# Patient Record
Sex: FEMALE | Race: WHITE | Employment: FULL TIME | URBAN - METROPOLITAN AREA
[De-identification: names, ages, dates, MRNs, and addresses within clinical notes are randomized per-mention and may not be internally consistent; named-entity substitution may affect disease eponyms.]

---

## 2017-08-31 RX ORDER — METFORMIN HYDROCHLORIDE 500 MG/1
TABLET, EXTENDED RELEASE ORAL
Qty: 30 TABLET | Refills: 1 | Status: SHIPPED | OUTPATIENT
Start: 2017-08-31 | End: 2017-10-19 | Stop reason: SDUPTHER

## 2017-08-31 RX ORDER — FOLIC ACID 1 MG/1
TABLET ORAL
Qty: 30 TABLET | Refills: 1 | Status: SHIPPED | OUTPATIENT
Start: 2017-08-31 | End: 2017-10-19 | Stop reason: SDUPTHER

## 2017-08-31 RX ORDER — PNV,CALCIUM 72/IRON/FOLIC ACID 27 MG-1 MG
TABLET ORAL
Qty: 30 TABLET | Refills: 1 | Status: SHIPPED | OUTPATIENT
Start: 2017-08-31 | End: 2017-10-19 | Stop reason: SDUPTHER

## 2017-09-10 ENCOUNTER — HOSPITAL ENCOUNTER (EMERGENCY)
Age: 37
Discharge: HOME | End: 2017-09-10
Attending: EMERGENCY MEDICINE
Payer: COMMERCIAL

## 2017-09-10 VITALS
DIASTOLIC BLOOD PRESSURE: 95 MMHG | HEIGHT: 62 IN | HEART RATE: 85 BPM | BODY MASS INDEX: 45.82 KG/M2 | WEIGHT: 249 LBS | OXYGEN SATURATION: 97 % | RESPIRATION RATE: 14 BRPM | SYSTOLIC BLOOD PRESSURE: 110 MMHG | TEMPERATURE: 98.1 F

## 2017-09-10 DIAGNOSIS — S86.912A KNEE STRAIN, LEFT, INITIAL ENCOUNTER: Primary | ICD-10-CM

## 2017-09-10 PROCEDURE — 99283 EMERGENCY DEPT VISIT LOW MDM: CPT

## 2017-09-10 RX ORDER — IBUPROFEN 800 MG/1
800 TABLET ORAL EVERY 8 HOURS PRN
Qty: 30 TABLET | Refills: 0 | Status: SHIPPED | OUTPATIENT
Start: 2017-09-10 | End: 2017-10-19

## 2017-09-10 ASSESSMENT — PAIN DESCRIPTION - LOCATION: LOCATION: KNEE

## 2017-09-10 ASSESSMENT — PAIN DESCRIPTION - PAIN TYPE: TYPE: ACUTE PAIN

## 2017-09-10 ASSESSMENT — PAIN SCALES - GENERAL: PAINLEVEL_OUTOF10: 8

## 2017-09-10 ASSESSMENT — PAIN DESCRIPTION - ORIENTATION: ORIENTATION: LEFT

## 2017-10-19 ENCOUNTER — OFFICE VISIT (OUTPATIENT)
Dept: OBGYN CLINIC | Age: 37
End: 2017-10-19
Payer: COMMERCIAL

## 2017-10-19 ENCOUNTER — HOSPITAL ENCOUNTER (OUTPATIENT)
Age: 37
Setting detail: SPECIMEN
Discharge: HOME | End: 2017-10-19
Payer: COMMERCIAL

## 2017-10-19 VITALS
RESPIRATION RATE: 18 BRPM | SYSTOLIC BLOOD PRESSURE: 118 MMHG | WEIGHT: 254 LBS | HEART RATE: 78 BPM | HEIGHT: 62 IN | BODY MASS INDEX: 46.74 KG/M2 | DIASTOLIC BLOOD PRESSURE: 72 MMHG

## 2017-10-19 DIAGNOSIS — Z87.42 HISTORY OF PCOS: ICD-10-CM

## 2017-10-19 DIAGNOSIS — Z87.42 HISTORY OF PCOS: Primary | ICD-10-CM

## 2017-10-19 DIAGNOSIS — N92.6 IRREGULAR PERIODS/MENSTRUAL CYCLES: ICD-10-CM

## 2017-10-19 LAB
ABSOLUTE EOS #: 0.1 K/UL (ref 0–0.4)
ABSOLUTE IMMATURE GRANULOCYTE: NORMAL K/UL (ref 0–0.3)
ABSOLUTE LYMPH #: 3.3 K/UL (ref 1–4.8)
ABSOLUTE MONO #: 0.4 K/UL (ref 0.1–1.3)
ALT SERPL-CCNC: 21 U/L (ref 5–33)
ANION GAP SERPL CALCULATED.3IONS-SCNC: 13 MMOL/L (ref 9–17)
AST SERPL-CCNC: 18 U/L
BASOPHILS # BLD: 0 %
BASOPHILS ABSOLUTE: 0 K/UL (ref 0–0.2)
BUN BLDV-MCNC: 7 MG/DL (ref 6–20)
BUN/CREAT BLD: ABNORMAL (ref 9–20)
CALCIUM SERPL-MCNC: 9.2 MG/DL (ref 8.6–10.4)
CHLORIDE BLD-SCNC: 102 MMOL/L (ref 98–107)
CO2: 24 MMOL/L (ref 20–31)
CREAT SERPL-MCNC: 0.54 MG/DL (ref 0.5–0.9)
DIFFERENTIAL TYPE: NORMAL
EOSINOPHILS RELATIVE PERCENT: 2 %
GFR AFRICAN AMERICAN: >60 ML/MIN
GFR NON-AFRICAN AMERICAN: >60 ML/MIN
GFR SERPL CREATININE-BSD FRML MDRD: ABNORMAL ML/MIN/{1.73_M2}
GFR SERPL CREATININE-BSD FRML MDRD: ABNORMAL ML/MIN/{1.73_M2}
GLUCOSE BLD-MCNC: 150 MG/DL (ref 70–99)
HCT VFR BLD CALC: 41.2 % (ref 36–46)
HEMOGLOBIN: 14.8 G/DL (ref 12–16)
IMMATURE GRANULOCYTES: NORMAL %
LYMPHOCYTES # BLD: 42 %
MCH RBC QN AUTO: 32.9 PG (ref 26–34)
MCHC RBC AUTO-ENTMCNC: 35.9 G/DL (ref 31–37)
MCV RBC AUTO: 91.6 FL (ref 80–100)
MONOCYTES # BLD: 5 %
PDW BLD-RTO: 12.5 % (ref 11.5–14.9)
PLATELET # BLD: 256 K/UL (ref 150–450)
PLATELET ESTIMATE: NORMAL
PMV BLD AUTO: 8.8 FL (ref 6–12)
POTASSIUM SERPL-SCNC: 3.4 MMOL/L (ref 3.7–5.3)
RBC # BLD: 4.5 M/UL (ref 4–5.2)
RBC # BLD: NORMAL 10*6/UL
SEG NEUTROPHILS: 51 %
SEGMENTED NEUTROPHILS ABSOLUTE COUNT: 4.1 K/UL (ref 1.3–9.1)
SODIUM BLD-SCNC: 139 MMOL/L (ref 135–144)
TSH SERPL DL<=0.05 MIU/L-ACNC: 1.19 MIU/L (ref 0.3–5)
WBC # BLD: 7.9 K/UL (ref 3.5–11)
WBC # BLD: NORMAL 10*3/UL

## 2017-10-19 PROCEDURE — 99214 OFFICE O/P EST MOD 30 MIN: CPT | Performed by: ADVANCED PRACTICE MIDWIFE

## 2017-10-19 PROCEDURE — 84460 ALANINE AMINO (ALT) (SGPT): CPT

## 2017-10-19 PROCEDURE — 85025 COMPLETE CBC W/AUTO DIFF WBC: CPT

## 2017-10-19 PROCEDURE — 84450 TRANSFERASE (AST) (SGOT): CPT

## 2017-10-19 PROCEDURE — 84443 ASSAY THYROID STIM HORMONE: CPT

## 2017-10-19 PROCEDURE — 80048 BASIC METABOLIC PNL TOTAL CA: CPT

## 2017-10-19 PROCEDURE — 36415 COLL VENOUS BLD VENIPUNCTURE: CPT

## 2017-10-19 RX ORDER — PNV,CALCIUM 72/IRON/FOLIC ACID 27 MG-1 MG
1 TABLET ORAL DAILY
Qty: 90 TABLET | Refills: 4 | Status: SHIPPED | OUTPATIENT
Start: 2017-10-19 | End: 2019-10-17 | Stop reason: SDUPTHER

## 2017-10-19 RX ORDER — METFORMIN HYDROCHLORIDE 500 MG/1
500 TABLET, EXTENDED RELEASE ORAL NIGHTLY
Qty: 90 TABLET | Refills: 4 | Status: SHIPPED | OUTPATIENT
Start: 2017-10-19 | End: 2019-10-17 | Stop reason: SDUPTHER

## 2017-10-19 RX ORDER — FOLIC ACID 1 MG/1
1 TABLET ORAL DAILY
Qty: 90 TABLET | Refills: 4 | Status: SHIPPED | OUTPATIENT
Start: 2017-10-19 | End: 2019-10-17 | Stop reason: SDUPTHER

## 2017-10-19 ASSESSMENT — PATIENT HEALTH QUESTIONNAIRE - PHQ9
SUM OF ALL RESPONSES TO PHQ QUESTIONS 1-9: 0
1. LITTLE INTEREST OR PLEASURE IN DOING THINGS: 0
2. FEELING DOWN, DEPRESSED OR HOPELESS: 0
SUM OF ALL RESPONSES TO PHQ9 QUESTIONS 1 & 2: 0

## 2017-10-19 NOTE — PATIENT INSTRUCTIONS
Learning About Natural Family Planning  What is natural family planning? Natural family planning is a way to find out which days of the month you are most likely to get pregnant. To prevent pregnancy, you do not have sex on those days. If you want to get pregnant, you have sex during those days. But a woman may use natural family planning and still not get the results she wants. This method may not work well for you if your periods are not regular. It also may not work well if you have problems keeping track of your periods or taking your temperature at the same time each day. How well does it work as birth control? Family planning takes a lot of effort. You must be very aware of your body. And you must track many things closely. It can be very hard to do \"exactly as directed. \" This type of family planning does not work better than other birth control methods. In the first year of use:  · When natural family planning is used exactly as directed, 5 women out of 100 have an unplanned pregnancy. · When it is not used exactly as directed, 24 women out of 100 have an unplanned pregnancy. How do you find out when you are likely to get pregnant? A woman who has regular periods has about 5 to 9 fertile days each month. These are the days when she can get pregnant. To find out when you are fertile, you must know when you release an egg (ovulate). There are several ways to find out when you are fertile. To get the result you want, you may need to use some of these methods at the same time. You should check your body changes using these methods for several months before you use them to avoid pregnancy. · In the calendar, or rhythm method, you write down when you start your period. This tells you how long your cycle is. It also tells you how regular it is. With this information, you can guess which days of the month you are most likely to be fertile. This is between 9 and 17 days before your next period.  This taking birth control pills. It also may not work well just before menopause. · You must pay attention to body changes and record all details. · You can't use the method until you've tracked body changes for a few months. Where can you learn more? Go to https://cheitan.[facility]. org and sign in to your Social[employer] account. Enter A452 in the Safend box to learn more about \"Learning About [facility]. \"     If you do not have an account, please click on the \"Sign Up Now\" link. Current as of: March 16, 2017  Content Version: 11.3  © 5017-9943 [employer]. Care instructions adapted under license by [facility] ([facility]). If you have questions about a medical condition or this instruction, always ask your healthcare professional. [address] any warranty or liability for your use of this information. clomiphene  Pronunciation:  Me[place] banks  Brand:  Clomid, Serophene  What is the most important information I should know about clomiphene? Do not use clomiphene if you are already pregnant. You should not use clomiphene if you have: liver disease, abnormal vaginal bleeding, an uncontrolled adrenal gland or thyroid disorder, an ovarian cyst (unrelated to polycystic ovary syndrome), or if you are pregnant. What is clomiphene? Clomiphene is a nonsteroidal fertility medicine. It causes the pituitary gland to release hormones needed to stimulate ovulation (the release of an egg from the ovary). Clomiphene is used to cause ovulation in women with certain medical conditions (such as polycystic ovary syndrome) that prevent naturally occurring ovulation. Clomiphene may also be used for purposes not listed in this medication guide. What should I discuss with my healthcare provider before taking clomiphene?   You should not use clomiphene if you are allergic to it, or if you have:  · abnormal vaginal bleeding;  · an ovarian cyst that is not related to polycystic ovary syndrome;  · past or present liver disease;  · a tumor of your pituitary gland;  · an untreated or uncontrolled problem with your thyroid or adrenal gland; or  · if you are pregnant. To make sure clomiphene is safe for you, tell your doctor if you have:  · endometriosis or uterine fibroids. Do not use clomiphene if you are already pregnant. Talk to your doctor if you have concerns about the possible effects of clomiphene on a new pregnancy. Clomiphene can pass into breast milk and may harm a nursing baby. This medicine may slow breast milk production in some women. Tell your doctor if you are breast-feeding a baby. Using clomiphene for longer than 3 treatment cycles may increase your risk of developing an ovarian tumor. Ask your doctor about your specific risk. Fertility treatment may increase your chance of having multiple births (twins, triplets). These are high-risk pregnancies both for the mother and the babies. Talk to your doctor if you have concerns about this risk. How should I take clomiphene? Your doctor will perform medical tests to make sure you do not have conditions that would prevent you from safely using clomiphene. Follow all directions on your prescription label. Your doctor may occasionally change your dose to make sure you get the best results. Do not take this medicine in larger or smaller amounts or for longer than recommended. Clomiphene is usually taken for 5 days, starting on the 5th day of your menstrual period. Follow your doctor's instructions. You will need to have a pelvic examination before each treatment cycle. You must remain under the care of a doctor while you are using clomiphene. You will most likely ovulate within 5 to 10 days after you take clomiphene. To improve your chance of becoming pregnant, you should have sexual intercourse while you are ovulating.   Your doctor may have you take your temperature each morning and record your daily readings on a chart. This will help you determine when you can expect ovulation to occur. In most cases, clomiphene should not be used for more than 3 treatment cycles. If ovulation occurs but you do not get pregnant after 3 treatment cycles, your doctor may stop treatment and evaluate your infertility further. Store at room temperature away from moisture, heat, and light. What happens if I miss a dose? Call your doctor for instructions if you miss a dose of clomiphene. What happens if I overdose? Seek emergency medical attention or call the Poison Help line at 1-163.871.3777. What should I avoid while taking clomiphene? This medication may cause blurred vision. Be careful if you drive or do anything that requires you to be alert and able to see clearly. What are the possible side effects of clomiphene? Get emergency medical help if you have signs of an allergic reaction: hives; difficult breathing; swelling of your face, lips, tongue, or throat. Some women using this medicine develop a condition called ovarian hyperstimulation syndrome (OHSS), especially after the first treatment. OHSS can be a life threatening condition. Call your doctor right away if you have any of the following symptoms of OHSS:  · stomach pain, bloating;  · nausea, vomiting, diarrhea;  · rapid weight gain, especially in your face and midsection;  · little or no urinating; or  · pain when you breathe, rapid heart rate, feeling short of breath (especially when lying down). Stop using clomiphene and call your doctor at once if you have:  · pelvic pain or pressure, enlargement in your pelvic area;  · vision problems;  · seeing flashes of light or \"floaters\" in your vision;  · increased sensitivity of your eyes to light; or  · heavy vaginal bleeding. Common side effects may include:  · flushing (warmth, redness, or tingly feeling);  · breast pain or tenderness;  · headache; or  · breakthrough bleeding or spotting.   This is not a complete list of side effects and others may occur. Call your doctor for medical advice about side effects. You may report side effects to FDA at 7-164-MMS-3199. What other drugs will affect clomiphene? Other drugs may interact with clomiphene, including prescription and over-the-counter medicines, vitamins, and herbal products. Tell each of your health care providers about all medicines you use now and any medicine you start or stop using. Where can I get more information? Your doctor or pharmacist can provide more information about clomiphene. Remember, keep this and all other medicines out of the reach of children, never share your medicines with others, and use this medication only for the indication prescribed. Every effort has been made to ensure that the information provided by [address] is accurate, up-to-date, and complete, but no guarantee is made to that effect. Drug information contained herein may be time sensitive. [facility] information has been compiled for use by healthcare practitioners and consumers in the [place] and therefore [employer] does not warrant that uses outside of the [place] are appropriate, unless specifically indicated otherwise. [facility]'s drug information does not endorse drugs, diagnose patients or recommend therapy. [facility] drug information is an informational resource designed to assist licensed healthcare practitioners in caring for their patients and/or to serve consumers viewing this service as a supplement to, and not a substitute for, the expertise, skill, knowledge and judgment of healthcare practitioners. The absence of a warning for a given drug or drug combination in no way should be construed to indicate that the drug or drug combination is safe, effective or appropriate for any given patient. [facility] does not assume any responsibility for any aspect of healthcare administered with the aid of information [facility] provides.  The information contained herein

## 2017-10-19 NOTE — PROGRESS NOTES
History and Physical  [address], [address] [facility] [address] [phone]   Fax [phone]  Karen Guerrero  10/19/2017              42 y.o. Chief Complaint   Patient presents with    Annual Exam       Patient's last menstrual period was 2017 (exact date). Primary Care Physician: Tito Galvez MD    The patient was seen and examined. She has no chief complaint today and is here for her annual exam.  Her bowels are regular. There are no voiding complaints. She denies any bloating. She denies vaginal discharge and was counseled on STD's and the need for barrier contraception. HPI : Karen Guerrero is a 42 y.o. female     Gyn exam, states trying to conceive for last 8 years with same partner, H/O endometriosis and surgery 8 years ago and ? PCO. She is on metformin, PVN, and folic acid per Dr Chance Master. Pt is requesting to try a few rounds of clomid. Her partner has 2 children.  Pt states Glucophage helps regulate her periods  ________________________________________________________________________  Obstetric History       T0      L0     SAB0   TAB0   Ectopic0   Molar0   Multiple0   Live Births0         Past Medical History:   Diagnosis Date    Acid reflux     Bruises easily     Insulin resistance     Obesity, Class III, BMI 40-49.9 (morbid obesity) ([facility] [address] 2015    Vision abnormalities                                                                    Past Surgical History:   Procedure Laterality Date    LAPAROSCOPY  2/1/10    Open    OTHER SURGICAL HISTORY  2/1/10    chromopertubation with bilateral patent fallopian tubes     Family History   Problem Relation Age of Onset    Heart Disease Father     Other Mother      pulmonary HTN, pulmonary fibrosis    Breast Cancer Mother      initially dx before age 48 & again after age 48   Ce [place] Other Sister      hysterectomy    Diabetes Sister and Situation  There is no Mood / Affect changes    Breast:  (Chest)  normal appearance, no masses or tenderness, Inspection negative  Self breast exams were reviewed in detail. Literature was given. Pelvic Exam:  Vulva and vagina appear normal. Bimanual exam reveals normal uterus and adnexa. Rectal Exam:  exam declined by patient          Musculosk:  Normal Gait and station was noted. Digits were evaluated without abnormal findings. Range of motion, stability and strength were evaluated and found to be appropriate for the patients age. OMM Structural Component:  The patient did not complain of a Chief complaint requiring OMM. Chief Complaint:none    Structural Exam: No Interest                  ASSESSMENT:      42 y.o. Annual  1. History of PCOS  ALT    AST    Basic Metabolic Panel    BUN & Creatinine    CBC Auto Differential    TSH with Reflex   2.  Irregular periods/menstrual cycles  ALT    AST    Basic Metabolic Panel    BUN & Creatinine    CBC Auto Differential    TSH with Reflex          Chief Complaint   Patient presents with    Annual Exam          Past Medical History:   Diagnosis Date    Acid reflux     Bruises easily     Insulin resistance     Obesity, Class III, BMI 40-49.9 (morbid obesity) ([facility] [address] 7/28/2015    Vision abnormalities          Patient Active Problem List    Diagnosis Date Noted    Insulin resistance      Priority: High     Repeat LFT and Renal Function in 6/2013      Metrorrhagia 10/31/2012     Priority: High    BMI 40.0-44.9, adult ([address] 10/31/2012     Priority: Medium    Family history of diabetes mellitus (DM) 10/31/2012     Priority: Medium    Acid reflux      Priority: Medium    Encounter for routine gynecological examination 10/31/2012     Priority: Low    Vision abnormalities      Priority: Low    Bruises easily      Priority: Low    Family history of breast cancer in mother 08/02/2016 8/25/2016 patient will need follow up mammogram in one year      Standing Expiration Date:   10/19/2018    AST     Standing Status:   Future     Standing Expiration Date:   10/19/2018    Basic Metabolic Panel     Standing Status:   Future     Standing Expiration Date:   10/19/2018    BUN & Creatinine     Standing Status:   Future     Standing Expiration Date:   10/19/2018    CBC Auto Differential     Standing Status:   Future     Standing Expiration Date:   10/19/2018    TSH with Reflex     Standing Status:   Future     Standing Expiration Date:   10/19/2018

## 2017-10-23 ENCOUNTER — TELEPHONE (OUTPATIENT)
Dept: OBGYN CLINIC | Age: 37
End: 2017-10-23

## 2017-10-23 NOTE — TELEPHONE ENCOUNTER
----- Message from Jimmy Wick CNM sent at 10/23/2017  8:09 AM EDT -----  Refer to PCP elevated glucose and low potassium  CBC, ALT, AST and TSH within normal limits

## 2017-11-30 ENCOUNTER — OFFICE VISIT (OUTPATIENT)
Dept: OBGYN CLINIC | Age: 37
End: 2017-11-30
Payer: COMMERCIAL

## 2017-11-30 VITALS
HEART RATE: 80 BPM | BODY MASS INDEX: 46.93 KG/M2 | DIASTOLIC BLOOD PRESSURE: 78 MMHG | HEIGHT: 62 IN | WEIGHT: 255 LBS | SYSTOLIC BLOOD PRESSURE: 124 MMHG | RESPIRATION RATE: 20 BRPM

## 2017-11-30 DIAGNOSIS — Z87.42 HISTORY OF PCOS: Primary | ICD-10-CM

## 2017-11-30 DIAGNOSIS — E88.81 INSULIN RESISTANCE: ICD-10-CM

## 2017-11-30 DIAGNOSIS — O09.529 ANTEPARTUM MULTIGRAVIDA OF ADVANCED MATERNAL AGE: ICD-10-CM

## 2017-11-30 PROCEDURE — 99214 OFFICE O/P EST MOD 30 MIN: CPT | Performed by: OBSTETRICS & GYNECOLOGY

## 2018-09-13 ENCOUNTER — HOSPITAL ENCOUNTER (OUTPATIENT)
Dept: PHYSICAL THERAPY | Age: 38
Setting detail: THERAPIES SERIES
Discharge: HOME | End: 2018-09-13
Payer: OTHER GOVERNMENT

## 2018-09-13 PROCEDURE — 97110 THERAPEUTIC EXERCISES: CPT

## 2018-09-13 PROCEDURE — 97162 PT EVAL MOD COMPLEX 30 MIN: CPT

## 2018-09-13 NOTE — CONSULTS
Standing [] [] []   Ambulation [] [] []   Groom/Dress [] [] []   Lift/Carry [] [x] []   Stairs [] [] []   Bending [] [x] []   OH reach [] [] []   Sit to Stand [] [] []     Comments:    Assessment:  Problems:    [x] ? Back Pain:  Back pain 3-7/10    [] ? Cervical Pain:    [x] ? ROM: Limited lumbar ROM with flexion, extension, left rotation    [x] ? Strength: Gross weakness bilateral LE and core  [x] ? Function:  Oswestry 11%  [x] Postural Deviations  [] Gait Deviations  [] Other:    STG: (to be met in 6 treatments)  1. ? Pain:  Back pain 0-2 with no left LE radic after work shift  2. ? ROM:  Lumbar ROM 90%   3. ? Strength: 5-  4. ? Function: 5% or less  5. Independent with Home Exercise Programs  6. Demonstrate Knowledge of fall prevention      Patient goals:  Feel better      Rehab Potential:  [x] Good  [] Fair  [] Poor   Suggested Professional Referral:  [x] No  [] Yes:  Barriers to Goal Achievement[de-identified]  [x] No  [] Yes:  Domestic Concerns:  [x] No  [] Yes:    Pt. Education:  [x] Plans/Goals, Risks/Benefits discussed  [x] Home exercise program  Method of Education: [x] Verbal  [x] Demo  [x] Written  Comprehension of Education:  [x] Verbalizes understanding. [x] Demonstrates understanding. [x] Needs Review. [] Demonstrates/verbalizes understanding of HEP/Ed previously given. Treatment Plan:  [x] Therapeutic Exercise    [x] Modalities:  [] Therapeutic Activity    [] Ultrasound  [x] Electrical Stimulation  [] Gait Training     []Massage       [x] Lumbar/Cervical Traction  [] Neuromuscular Re-education [x] Cold/hotpack [x] Iontophoresis: 4 mg/mL  [x] Instruction in HEP             Dexamethasone Sodium  [x] Manual Therapy             Phosphate 40-80 mAmin  [x] Aquatic Therapy   [x] Other: Dry Needling    [x]  Medication allergies reviewed for use of    Dexamethasone Sodium Phosphate 4mg/ml     with iontophoresis treatments. Pt is not allergic.     Frequency: WC authorization 6 visits total, valid 8/28 - 9/28.      Suly Bowens Treatment:  Modalities:   Precautions:  Bruises easily  Exercises:  Exercise Reps/ Time Weight/ Level Comments         TrA neutral draw in 5 sec hold x 10     HS and piriformis stretch 20-30 sec x 3 each                 Other:    Specific Instructions for next treatment:  Core stabs, LE strength, flexion bias    Treatment Charges: Mins Units   [x] Evaluation       []  Low       [x]  Moderate       []  High 30 1   []  Modalities     [x]  Ther Exercise 15 1   []  Manual Therapy     []  Ther Activities     []  Aquatics     []  Neuromuscular     []  Other       TOTAL TREATMENT TIME: 45    Time in: 1400      Time out: 9239    Electronically signed by: Suze Omalley PT        Physician Signature:________________________________Date:__________________  By signing above or cosigning this note, I have reviewed this plan of care and certify a need for medically necessary rehabilitation services.      *PLEASE SIGN ABOVE AND FAX BACK ALL PAGES*

## 2018-09-17 ENCOUNTER — HOSPITAL ENCOUNTER (OUTPATIENT)
Dept: PHYSICAL THERAPY | Age: 38
Setting detail: THERAPIES SERIES
Discharge: HOME | End: 2018-09-17
Payer: OTHER GOVERNMENT

## 2018-09-17 PROCEDURE — 97113 AQUATIC THERAPY/EXERCISES: CPT

## 2018-09-17 NOTE — PROGRESS NOTES
Physical Therapy    [address] [facility]   [address]   Phone: [phone]   Fax: [phone]  Physical Therapy Daily Treatment Note  Date:  2018  Patient Name:  Elmo Domínguez    :  1980  MRN: [id number]  Physician: Bernice Diop MD                                    Insurance: [facility]     Eligibility Status: Erin Short  # of visits allowed/remainin visits total, valid  - . Source: Fax  Auth: NPRe  Medical Diagnosis: Strain of muscle, fascia and tendon of lower back, initial encounter     Rehab Codes: M54.5, M54.16  Onset Date: 2018                       Next 's appt. : tbd  Visit# / total visits: 2/  Cancels/No Shows: 0/0    Subjective:  States she is working mostly full duty now without issue- occasionally gets pain shooting down L LE but only with lifting or full WB on L LE. Denies pain upon arrival  Pain:  [] Yes  [x] No Location: N/A Pain Rating: (0-10 scale) 0/10  Pain altered Tx:  [x] No  [] Yes  Action:  Comments: Initiated aquatic therapy with emphasis on core stability and postural awareness; educated patient on pelvic neutral and benefits of aquatic therapy    Objective:  Exercises:  [facility] Exercise Log  Aquatic, Hip & DLS Program- Phase 1    Date of Eval:                                Primary PT:  Diagnosis: Things to Focus On (goals):   Surgical Precautions:  Medical Precautions:  [] C-9 dates  [] Occ Med   [] Medicare       Date 18       Visit # 2/12       Walk F/L/R 2 Laps       Marching 10x       Squats 10x5\"       Step-Ups F/L        Heel-toe raises 10x       SLR F/L/R 10x       Knee/Flex/Ext 10x       F/L Lunges        Kickboard Ex. Med       Iso Abd. 10x5\"       Push-pull 10x       Paddling                Deep Water        Hang ?                Stretches        Achllies 2x20\"       Hamstring 2x20\"               Cool Down 2 Laps       Pain

## 2018-09-20 ENCOUNTER — HOSPITAL ENCOUNTER (OUTPATIENT)
Dept: PHYSICAL THERAPY | Age: 38
Setting detail: THERAPIES SERIES
Discharge: HOME | End: 2018-09-20
Payer: OTHER GOVERNMENT

## 2018-09-20 PROCEDURE — 97110 THERAPEUTIC EXERCISES: CPT

## 2018-09-20 PROCEDURE — 97140 MANUAL THERAPY 1/> REGIONS: CPT

## 2018-09-21 ENCOUNTER — HOSPITAL ENCOUNTER (OUTPATIENT)
Dept: PHYSICAL THERAPY | Age: 38
Setting detail: THERAPIES SERIES
Discharge: HOME | End: 2018-09-21
Payer: OTHER GOVERNMENT

## 2018-09-21 PROCEDURE — 97140 MANUAL THERAPY 1/> REGIONS: CPT

## 2018-09-21 PROCEDURE — 97110 THERAPEUTIC EXERCISES: CPT

## 2018-09-21 NOTE — FLOWSHEET NOTE
[address] [facility]   [address] [facility] [address]   Phone: [phone]   Fax: [phone]    Physical Therapy Daily Treatment Note      Date:  2018  Patient Name:  Kathryn Sandoval    :  1980  MRN: [id number]  Physician: Jonelle Bernal MD                                    Insurance: [facility]              Eligibility Status:  Eligible        DOS: 18  # of visits allowed/remainin visits total, valid  - . Source: Fax  Spoke To: Isidor Just  Reference: Na  Auth: NPRe     Electronically signed by Cyn Nagel on 18 at 10:04 AM  Medical Diagnosis: Strain of muscle, fascia and tendon of lower back, initial encounter     Rehab Codes: M54.5, M54.16  Onset Date: 2018                       Next 's appt. : tbd  Visit# / total visits: 4/6 (6 visits total, valid  - )  Cancels/No Shows: 0/0    Subjective:  Patient presents to clinic p working 3 hours. No sharp, shooting pain in back  reported. Pain:  [x] Yes  [] No Location: Back pain  N/A Pain Rating: (0-10 scale) 0/10, sharp  Pain altered Tx:  [] No  [x] Yes  Action: Back pain improving. Comments:    Objective:  Modalities:   Precautions:  Bruises easily  Exercises:  Exercise Reps/ Time Weight/ Level Comments    4 point UE/LE  10       TrA neutral draw in 5 sec hold x 10       TrA alt LE/UE 10     HS and piriformis stretch 20-30 sec x 3 each        Hip ER/ABD  10       Stand side and front plank 10 sec hold x 3     Rotational walkout 3  Blue band          Manual:  Left upslip correction      Negative forward bend and Stork, isch tub aligned. Other:  Review body mechanics and lifting with neutral spine and draw in.     Specific Instructions for next treatment:  Core stabs, LE strength, flexion bias    Treatment Charges: Mins Units   []  Modalities     [x]  Ther Exercise 30 2   [x]  Manual Therapy 10 1   []  Ther Activities     []  Aquatics     []  Neuromuscular     [] Other     Total Treatment time 40 3       Assessment: [x] Progressing toward goals. Pelvic obliquity improved. Progressed core therex to standing. Reviewed stabs in supine, 4 point, standing. HEP for standing core therex issued with ISABEL, richie RD, copy in softchart. Recheck therex next visit and DC.        [] No change. [] Other:    STG/LTG  STG: (to be met in 6 treatments)  1. ? Pain:  Back pain 0-2 with no left LE radic after work shift  2. ? ROM:  Lumbar ROM 90%   3. ? Strength: 5-  4. ? Function: 5% or less  5. Independent with Home Exercise Programs  6. Demonstrate Knowledge of fall prevention        Patient goals:  Feel better    Pt. Education:  [x] Yes  [] No  [x] Reviewed Prior HEP/Ed  Method of Education: [x] Verbal  [x] Demo  [x] Written  Comprehension of Education:  [x] Verbalizes understanding. [x] Demonstrates understanding. [] Needs review. [] Demonstrates/verbalizes HEP/Ed previously given. Plan: [x] Continue per plan of care.    [] Other:      Time H           Time Out: 5242    Electronically signed by:  Preet Matias, PT

## 2018-09-27 ENCOUNTER — HOSPITAL ENCOUNTER (OUTPATIENT)
Dept: PHYSICAL THERAPY | Age: 38
Setting detail: THERAPIES SERIES
Discharge: HOME | End: 2018-09-27
Payer: OTHER GOVERNMENT

## 2018-09-27 PROCEDURE — 97110 THERAPEUTIC EXERCISES: CPT

## 2019-09-19 ENCOUNTER — HOSPITAL ENCOUNTER (OUTPATIENT)
Age: 39
Discharge: HOME | End: 2019-09-19
Payer: COMMERCIAL

## 2019-09-19 DIAGNOSIS — Z13.220 SCREENING FOR HYPERLIPIDEMIA: ICD-10-CM

## 2019-09-19 DIAGNOSIS — Z11.4 SCREENING FOR HIV WITHOUT PRESENCE OF RISK FACTORS: ICD-10-CM

## 2019-09-19 DIAGNOSIS — E55.9 VITAMIN D DEFICIENCY: ICD-10-CM

## 2019-09-19 DIAGNOSIS — R53.83 FATIGUE, UNSPECIFIED TYPE: ICD-10-CM

## 2019-09-19 LAB
CHOLESTEROL, FASTING: 190 MG/DL
CHOLESTEROL/HDL RATIO: 3.2
HDLC SERPL-MCNC: 60 MG/DL
HIV AG/AB: NONREACTIVE
LDL CHOLESTEROL: 102 MG/DL (ref 0–130)
THYROXINE, FREE: 1.11 NG/DL (ref 0.93–1.7)
TRIGLYCERIDE, FASTING: 139 MG/DL
TSH SERPL DL<=0.05 MIU/L-ACNC: 1.14 MIU/L (ref 0.3–5)
VITAMIN D 25-HYDROXY: 13.6 NG/ML (ref 30–100)
VLDLC SERPL CALC-MCNC: NORMAL MG/DL (ref 1–30)

## 2019-09-19 PROCEDURE — 36415 COLL VENOUS BLD VENIPUNCTURE: CPT

## 2019-09-19 PROCEDURE — 84443 ASSAY THYROID STIM HORMONE: CPT

## 2019-09-19 PROCEDURE — 80061 LIPID PANEL: CPT

## 2019-09-19 PROCEDURE — 87389 HIV-1 AG W/HIV-1&-2 AB AG IA: CPT

## 2019-09-19 PROCEDURE — 82306 VITAMIN D 25 HYDROXY: CPT

## 2019-09-19 PROCEDURE — 84439 ASSAY OF FREE THYROXINE: CPT

## 2019-09-27 ENCOUNTER — HOSPITAL ENCOUNTER (OUTPATIENT)
Dept: WOMENS IMAGING | Age: 39
Discharge: HOME | End: 2019-09-29
Payer: COMMERCIAL

## 2019-09-27 DIAGNOSIS — Z12.31 ENCOUNTER FOR SCREENING MAMMOGRAM FOR BREAST CANCER: ICD-10-CM

## 2019-09-27 PROCEDURE — 77063 BREAST TOMOSYNTHESIS BI: CPT

## 2019-10-04 ENCOUNTER — TELEPHONE (OUTPATIENT)
Dept: ONCOLOGY | Age: 39
End: 2019-10-04

## 2019-10-04 DIAGNOSIS — Z91.89 AT HIGH RISK FOR BREAST CANCER: Primary | ICD-10-CM

## 2020-08-18 ENCOUNTER — NURSE TRIAGE (OUTPATIENT)
Dept: OTHER | Facility: CLINIC | Age: 40
End: 2020-08-18

## 2020-08-18 NOTE — TELEPHONE ENCOUNTER
Seen that there were no openings was not sure if you wanted to try and fit her in or not? Please advise.

## 2020-08-18 NOTE — TELEPHONE ENCOUNTER
Reason for Disposition   MODERATE pain (e.g., interferes with normal activities) and present > 3 days    Answer Assessment - Initial Assessment Questions  1. ONSET: \"When did the pain start? \"      Friday   2. LOCATION: \"Where is the pain located? \"      Right hand  3. PAIN: \"How bad is the pain? \" (Scale 1-10; or mild, moderate, severe)    - MILD (1-3): doesn't interfere with normal activities    - MODERATE (4-7): interferes with normal activities (e.g., work or school) or awakens from sleep    - SEVERE (8-10): excruciating pain, unable to use hand at all      Pain with movement/gripping 6/10  4. WORK OR EXERCISE: \"Has there been any recent work or exercise that involved this part of the body? \"      At work, picking up a package. 5. CAUSE: \"What do you think is causing the pain? \"      Picking up a heavy package. 6. AGGRAVATING FACTORS: \"What makes the pain worse? \" (e.g., using computer)      Gripping items. 7. OTHER SYMPTOMS: \"Do you have any other symptoms? \" (e.g., neck pain, swelling, rash, numbness, fever)      No  8. PREGNANCY: \"Is there any chance you are pregnant? \" \"When was your last menstrual period? \"      No    Protocols used: HAND AND WRIST PAIN-ADULT-[place]      Care advice given. Connecting with [place]. Instructed to call back or go to urgent care if symptoms worsen. Call from [place]. Please do not respond to the triage nurse through this encounter. Any subsequent communication should be directly with the patient.

## 2020-12-24 ENCOUNTER — HOSPITAL ENCOUNTER (OUTPATIENT)
Age: 40
Setting detail: SPECIMEN
Discharge: HOME | End: 2020-12-24
Payer: COMMERCIAL

## 2020-12-24 ENCOUNTER — OFFICE VISIT (OUTPATIENT)
Dept: PRIMARY CARE CLINIC | Age: 40
End: 2020-12-24
Payer: COMMERCIAL

## 2020-12-24 VITALS
BODY MASS INDEX: 45.08 KG/M2 | HEART RATE: 90 BPM | WEIGHT: 245 LBS | TEMPERATURE: 98.6 F | HEIGHT: 62 IN | OXYGEN SATURATION: 98 %

## 2020-12-24 PROCEDURE — 99213 OFFICE O/P EST LOW 20 MIN: CPT | Performed by: NURSE PRACTITIONER

## 2020-12-24 PROCEDURE — 87880 STREP A ASSAY W/OPTIC: CPT | Performed by: NURSE PRACTITIONER

## 2020-12-24 ASSESSMENT — ENCOUNTER SYMPTOMS
SORE THROAT: 1
RHINORRHEA: 1
DIARRHEA: 0
SHORTNESS OF BREATH: 0
COUGH: 1

## 2020-12-24 ASSESSMENT — PATIENT HEALTH QUESTIONNAIRE - PHQ9
SUM OF ALL RESPONSES TO PHQ QUESTIONS 1-9: 0
SUM OF ALL RESPONSES TO PHQ9 QUESTIONS 1 & 2: 0
1. LITTLE INTEREST OR PLEASURE IN DOING THINGS: 0
SUM OF ALL RESPONSES TO PHQ QUESTIONS 1-9: 0
2. FEELING DOWN, DEPRESSED OR HOPELESS: 0
SUM OF ALL RESPONSES TO PHQ QUESTIONS 1-9: 0

## 2020-12-24 NOTE — LETTER
[address] [place] [id number]  Phone: [phone]  Fax: [phone]    MEREDITH Nesbitt CNP        December 24, 2020     Patient: Natan Lei   YOB: 1980   Date of Visit: 12/24/2020       To Whom It May Concern: It is my medical opinion that Natan Lei was seen today and is awaiting results and should quarantine/isolate until results available. -You have had no fever for at least 24 hours (that is one full days of no fever without the use medicine that reduces fevers)  -AND other symptoms have improved (for example, when your cough or shortness of breath have improved)  -AND at least 10 days have passed since your symptoms first appeared  If you have any questions or concerns, please don't hesitate to call.     Sincerely,          MEREDITH Nesbitt CNP

## 2020-12-24 NOTE — PATIENT INSTRUCTIONS
Your strep test was negative here today    You were tested for COVID today. We will call you with results when they are available. If you have not heard from us in 7 days, please call our office. Quarantine/Isolation as directed  Await results  Recommend not going out, stay home and await results    Increase fluids- stay hydrated  Good handwashing  Supportive care as discussed, treat symptoms     If having symptoms- you need to be fever free for 24 hours, other symptoms resolved and at least 10 days passed since first symptom appeared before discontinuing  quarantine- CDC guidelines    tylenol as directed as needed over the counter if able to take  go to the ER for chest pain, short of breath, abdominal pain, dizzy, hard time breathing, persistent vomiting, feeling weaker or dehydrated, any worsening, change or concern  Follow up with primary care for re evaluation- recommend virtual visit     [place] covid 19 call center - [phone]- [phone]  Another good resource for information is coronavirus. [place].gov    For one with known covid 19 exposure, we recommend 14 day quarantine. The COVID-19 test that was done today can take 1-6 days for results. Until then you should assume you have this disease and adhere to home isolation as described below. When we get the test results back, one of the following readings will be obtained. 1. A positive test means you have the virus. 2.  An inconclusive test means it wasn't sure if you have the virus or not. An inconclusive test result is treated as a positive result and recommendations  are the same as a positive test result. We may ask you to repeat this test in this circumstance. 3.  A negative test means you probably do not have the virus.       Prevention steps for People with positive or inconclusive test results or suspected  COVID-19 (including persons under investigation) who do not need to be hospitalized  and People with confirmed COVID-19 who were hospitalized and determined to be medically stable to go home      Your healthcare provider and public health staff will evaluate whether you can be cared for at home. If it is determined that you do not need to be hospitalized and can be isolated at home, you will be monitored by staff from your health department. You should follow the prevention steps below until a healthcare provider or local or state health department says you can return to your normal activities. Stay home except to get medical care  People who are mildly ill with COVID-19 are able to isolate at home during their illness. You should restrict activities outside your home, except for getting medical care. Do not go to work, school, or public areas. Avoid using public transportation, ride-sharing, or taxis. Separate yourself from other people and animals in your home  People: As much as possible, you should stay in a specific room and away from other people in your home. Also, you should use a separate bathroom, if available. Animals: You should restrict contact with pets and other animals while you are sick with COVID-19, just like you would around other people. Although there have not been reports of pets or other animals becoming sick with COVID-19, it is still recommended that people sick with COVID-19 limit contact with animals until more information is known about the virus. When possible, have another member of your household care for your animals while you are sick. If you are sick with COVID-19, avoid contact with your pet, including petting, snuggling, being kissed or licked, and sharing food. If you must care for your pet or be around animals while you are sick, wash your hands before and after you interact with pets and wear a facemask.   Call ahead before visiting your doctor If you have a medical appointment, call the healthcare provider and tell them that you have or may have COVID-19. This will help the healthcare providers office take steps to keep other people from getting infected or exposed. Wear a facemask  You should wear a facemask when you are around other people (e.g., sharing a room or vehicle) or pets and before you enter a healthcare providers office. If you are not able to wear a facemask (for example, because it causes trouble breathing), then people who live with you should not stay in the same room with you; they should also wear a facemask if they enter your room. Cover your coughs and sneezes  Cover your mouth and nose with a tissue when you cough or sneeze. Throw used tissues in a lined trash can. Immediately wash your hands with soap and water for at least 20 seconds or, if soap and water are not available, clean your hands with an alcohol-based hand  that contains at least 60% alcohol. Clean your hands often  Wash your hands often with soap and water for at least 20 seconds, especially after blowing your nose, coughing, or sneezing; going to the bathroom; and before eating or preparing food. If soap and water are not readily available, use an alcohol-based hand  with at least 60% alcohol, covering all surfaces of your hands and rubbing them together until they feel dry. Soap and water are the best option if hands are visibly dirty. Avoid touching your eyes, nose, and mouth with unwashed hands. Avoid sharing personal household items  You should not share dishes, drinking glasses, cups, eating utensils, towels, or bedding with other people or pets in your home. After using these items, they should be washed thoroughly with soap and water.   Clean all high-touch surfaces everyday High touch surfaces include counters, tabletops, doorknobs, bathroom fixtures, toilets, phones, keyboards, tablets, and bedside tables. Also, clean any surfaces that may have blood, stool, or body fluids on them. Use a household cleaning spray or wipe, according to the label instructions. Labels contain instructions for safe and effective use of the cleaning product including precautions you should take when applying the product, such as wearing gloves and making sure you have good ventilation during use of the product. Monitor your symptoms  Seek prompt medical attention if your illness is worsening (e.g., difficulty breathing). Before seeking care, call your healthcare provider and tell them that you have, or are being evaluated for, COVID-19. Put on a facemask before you enter the facility. These steps will help the healthcare providers office to keep other people in the office or waiting room from getting infected or exposed. Persons who are placed under active monitoring or facilitated self-monitoring should follow instructions provided by their local health department or occupational health professionals, as appropriate. When working with your local health department check their available hours. If you have a medical emergency and need to call 911, notify the dispatch personnel that you have, or are being evaluated for COVID-19. If possible, put on a facemask before emergency medical services arrive. Discontinuing home isolation  Patients with confirmed COVID-19 should remain under home isolation precautions until the risk of secondary transmission to others is thought to be low. The decision to discontinue home isolation precautions should be made on a case-by-case basis, in consultation with your physician and the health department. Please do NOT make this decision on your own.       If your results of the COVID-19 test is NEGATIVE - The patient may stop isolation, in consultation with your health care provider, typically when: Your healthcare provider has determined that the cause of the illness is NOT COVID-19 and approves your return to work. OR  Ten (10) days have passed since onset of symptoms AND one day (24 hours) have passed with no fever without taking medication (like Tylenol) to reduce fever,  respiratory symptoms have resolved and you have been evaluated by your health care provider. Loss of taste and smell may persist.  Please follow up with your physician for evaluation about this. The following websites are the best places for up to date information on this fluid situation. https://coronavirus. [place].gov/wps/portal/gov/covid-19/home/local-health-districts-and-providers/guidance-for-covid-19-exposure-management    Preventing the Spread of Coronavirus Disease 2019 in Homes and Residential Communities     For the most recent information go to [email or web address]  https://coronavirus. [place].gov/wps/portal/gov/covid-19/home/local-health-districts-and-providers/guidance-for-covid-19-exposure-management no back pain, no gout, no musculoskeletal pain, no neck pain, and no weakness.

## 2020-12-24 NOTE — PROGRESS NOTES
[facility] [address] [address]  Dept: [phone]  Dept Fax: [phone]    Citlalli Gordon a 41 y.o. female who presents to the urgent care today for her medical conditions/complaintsas noted below. Citlalli Gordon is c/o of Concern For COVID-19 (sore throat, congestion, itchiness in ear x 3 days )      HPI:     Cc sore throat, congestion, ears itches for 3 days  Denies fever, vomiting, diarrhea, abd pain or known covid exposure  Denies cp, sob  Feels like sinus    URI   This is a new problem. Episode onset: 3 days. The problem has been waxing and waning. There has been no fever. Associated symptoms include congestion, coughing, rhinorrhea and a sore throat. Pertinent negatives include no chest pain or diarrhea. Past Medical History:   Diagnosis Date    Acid reflux     Bruises easily     Insulin resistance     Obesity, Class III, BMI 40-49.9 (morbid obesity) ([facility]) 7/28/2015    Vision abnormalities        Current Outpatient Medications   Medication Sig Dispense Refill    loratadine (CLARITIN) 10 MG tablet Take 1 tablet by mouth daily (Patient not taking: Reported on 12/24/2020) 30 tablet 2    metFORMIN (GLUCOPHAGE-XR) 500 MG extended release tablet Take 1 tablet by mouth nightly (Patient not taking: Reported on 5/29/2020) 90 tablet 4    folic acid (FOLVITE) 1 MG tablet Take 1 tablet by mouth daily (Patient not taking: Reported on 5/29/2020) 90 tablet 4    Prenatal Vit-Fe Fumarate-FA (PREPLUS) 27-1 MG TABS Take 1 tablet by mouth daily (Patient not taking: Reported on 5/29/2020) 90 tablet 4    gabapentin (NEURONTIN) 100 MG capsule Take 1 capsule by mouth 3 times daily for 30 days.  (Patient not taking: Reported on 5/29/2020) 90 capsule 3    meloxicam (MOBIC) 15 MG tablet Take 1 tablet by mouth daily (Patient not taking: Reported on 5/29/2020) 30 tablet 3 No current facility-administered medications for this visit. No Known Allergies    Subjective:     Review of Systems   HENT: Positive for congestion, rhinorrhea and sore throat. Respiratory: Positive for cough. Negative for shortness of breath. Cardiovascular: Negative for chest pain. Gastrointestinal: Negative for diarrhea. All other systems reviewed and are negative. Objective:      Physical Exam  Vitals signs and nursing note reviewed. Constitutional:       General: She is not in acute distress. Appearance: Normal appearance. She is well-developed. She is not ill-appearing, toxic-appearing or diaphoretic. HENT:      Head: Normocephalic and atraumatic. Right Ear: Tympanic membrane normal.      Left Ear: Tympanic membrane normal.      Nose: Congestion present. Mouth/Throat:      Mouth: Mucous membranes are moist.   Eyes:      General: No scleral icterus. Right eye: No discharge. Left eye: No discharge. Neck:      Musculoskeletal: Normal range of motion and neck supple. No neck rigidity. Cardiovascular:      Rate and Rhythm: Normal rate and regular rhythm. Heart sounds: Normal heart sounds. No murmur. Pulmonary:      Effort: Pulmonary effort is normal. No respiratory distress. Breath sounds: Normal breath sounds. No stridor. No wheezing, rhonchi or rales. Abdominal:      General: Bowel sounds are normal.      Palpations: Abdomen is soft. Musculoskeletal: Normal range of motion. General: No signs of injury. Lymphadenopathy:      Cervical: No cervical adenopathy. Skin:     General: Skin is warm and dry. Coloration: Skin is not jaundiced or pale. Findings: No erythema or rash. Neurological:      General: No focal deficit present. Mental Status: She is alert and oriented to person, place, and time.    Psychiatric:         Mood and Affect: Mood normal.         Behavior: Behavior normal. Pulse 90   Temp 98.6 °F (37 °C)   Ht 5' 2\" (1.575 m)   Wt 245 lb (111.1 kg)   SpO2 98%   BMI 44.81 kg/m²       Assessment:          Diagnosis Orders   1. Viral illness  COVID-19 Ambulatory   2. Sore throat  POCT rapid strep A    Strep A DNA probe, amplification       Plan:    rapid strep was neg here today    You were tested for COVID today. We will call you with results when they are available. If you have not heard from us in 7 days, please call our office. Quarantine/Isolation as directed  Await results  Recommend not going out, stay home and await results    Increase fluids- stay hydrated  Good handwashing  Supportive care as discussed, treat symptoms     If having symptoms- you need to be fever free for 24 hours, other symptoms resolved and at least 10 days passed since first symptom appeared before discontinuing  quarantine- CDC guidelines    tylenol as directed as needed over the counter if able to take  go to the ER for chest pain, short of breath, abdominal pain, dizzy, hard time breathing, persistent vomiting, feeling weaker or dehydrated, any worsening, change or concern  Follow up with primary care for re evaluation- recommend virtual visit     [place] covid 19 call center - [phone]- [phone]  Another good resource for information is coronavirus. [place].gov    For one with known covid 19 exposure, we recommend 14 day quarantine. The COVID-19 test that was done today can take 1-6 days for results. Until then you should assume you have this disease and adhere to home isolation as described below. When we get the test results back, one of the following readings will be obtained. 1. A positive test means you have the virus. 2.  An inconclusive test means it wasn't sure if you have the virus or not. An inconclusive test result is treated as a positive result and recommendations  are the same as a positive test result. We may ask you to repeat this test in this circumstance. 3.  A negative test means you probably do not have the virus. Prevention steps for People with positive or inconclusive test results or suspected  COVID-19 (including persons under investigation) who do not need to be hospitalized  and   People with confirmed COVID-19 who were hospitalized and determined to be medically stable to go home      Your healthcare provider and public health staff will evaluate whether you can be cared for at home. If it is determined that you do not need to be hospitalized and can be isolated at home, you will be monitored by staff from your health department. You should follow the prevention steps below until a healthcare provider or local or Counts include 234 beds at the [facility] health department says you can return to your normal activities. Stay home except to get medical care  People who are mildly ill with COVID-19 are able to isolate at home during their illness. You should restrict activities outside your home, except for getting medical care. Do not go to work, school, or public areas. Avoid using public transportation, ride-sharing, or taxis. Separate yourself from other people and animals in your home  People: As much as possible, you should stay in a specific room and away from other people in your home. Also, you should use a separate bathroom, if available. The following websites are the best places for up to date information on this fluid situation. https://coronavirus. [place].gov/wps/portal/gov/covid-19/home/local-health-districts-and-providers/guidance-for-covid-19-exposure-management    Preventing the Spread of Coronavirus Disease 2019 in Homes and Residential Communities     For the most recent information go to [employer]  https://coronavirus. [place].gov/wps/portal/gov/covid-19/home/local-health-districts-and-providers/guidance-for-covid-19-exposure-management  No follow-ups on file. Patient given educational materials - see patientinstructions. Discussed use, benefit, and side effects of prescribed medications. All patient questions answered. Pt voiced understanding.     Electronically signed by MEREDITH Castaneda 12/24/2020 at 12:16 PM

## 2020-12-25 LAB
DIRECT EXAM: NORMAL
Lab: NORMAL
SPECIMEN DESCRIPTION: NORMAL

## 2020-12-26 LAB — SARS-COV-2, NAA: NOT DETECTED

## 2021-10-13 SDOH — ECONOMIC STABILITY: FOOD INSECURITY: WITHIN THE PAST 12 MONTHS, YOU WORRIED THAT YOUR FOOD WOULD RUN OUT BEFORE YOU GOT MONEY TO BUY MORE.: NEVER TRUE

## 2021-10-13 SDOH — ECONOMIC STABILITY: FOOD INSECURITY: WITHIN THE PAST 12 MONTHS, THE FOOD YOU BOUGHT JUST DIDN'T LAST AND YOU DIDN'T HAVE MONEY TO GET MORE.: NEVER TRUE

## 2021-10-13 ASSESSMENT — PATIENT HEALTH QUESTIONNAIRE - PHQ9
2. FEELING DOWN, DEPRESSED OR HOPELESS: 0
SUM OF ALL RESPONSES TO PHQ QUESTIONS 1-9: 0
1. LITTLE INTEREST OR PLEASURE IN DOING THINGS: 0
SUM OF ALL RESPONSES TO PHQ QUESTIONS 1-9: 0
SUM OF ALL RESPONSES TO PHQ9 QUESTIONS 1 & 2: 0
SUM OF ALL RESPONSES TO PHQ QUESTIONS 1-9: 0

## 2021-10-13 ASSESSMENT — SOCIAL DETERMINANTS OF HEALTH (SDOH): HOW HARD IS IT FOR YOU TO PAY FOR THE VERY BASICS LIKE FOOD, HOUSING, MEDICAL CARE, AND HEATING?: NOT HARD AT ALL

## 2021-10-13 NOTE — PROGRESS NOTES
Visit Information    Have you changed or started any medications since your last visit including any over-the-counter medicines, vitamins, or herbal medicines? no   Have you stopped taking any of your medications? Is so, why? -  no  Are you having any side effects from any of your medications? - no    Have you seen any other physician or provider since your last visit?  no   Have you had any other diagnostic tests since your last visit?  no   Have you been seen in the emergency room and/or had an admission in a hospital since we last saw you?  no   Have you had your routine dental cleaning in the past 6 months?  no     Do you have an active MyChart account? If no, what is the barrier?   Yes    Patient Care Team:  Judit Guaman MD as PCP - Martita Connell MD as PCP - [facility]  Soheila Neal DO as Consulting Physician (Obstetrics & Gynecology)    Medical History Review  Past Medical, Family, and Social History reviewed and does contribute to the patient presenting condition    Health Maintenance   Topic Date Due    Hepatitis C screen  Never done    COVID-19 Vaccine (1) Never done    DTaP/Tdap/Td vaccine (1 - Tdap) Never done    Cervical cancer screen  01/15/2018    A1C test (Diabetic or Prediabetic)  09/16/2020    Breast cancer screen  09/27/2020    Flu vaccine (1) Never done    Lipid screen  09/19/2024    HIV screen  Completed    Hepatitis A vaccine  Aged Out    Hepatitis B vaccine  Aged Out    Hib vaccine  Aged Out    Meningococcal (ACWY) vaccine  Aged Out    Pneumococcal 0-64 years Vaccine  Aged Out

## 2021-10-14 ENCOUNTER — TELEMEDICINE (OUTPATIENT)
Dept: FAMILY MEDICINE CLINIC | Age: 41
End: 2021-10-14
Payer: COMMERCIAL

## 2021-10-14 DIAGNOSIS — G89.29 CHRONIC PAIN OF LEFT KNEE: ICD-10-CM

## 2021-10-14 DIAGNOSIS — E28.2 PCOS (POLYCYSTIC OVARIAN SYNDROME): ICD-10-CM

## 2021-10-14 DIAGNOSIS — Z00.00 PREVENTATIVE HEALTH CARE: ICD-10-CM

## 2021-10-14 DIAGNOSIS — E66.01 CLASS 3 SEVERE OBESITY DUE TO EXCESS CALORIES WITH SERIOUS COMORBIDITY AND BODY MASS INDEX (BMI) OF 40.0 TO 44.9 IN ADULT (HCC): ICD-10-CM

## 2021-10-14 DIAGNOSIS — M25.562 CHRONIC PAIN OF LEFT KNEE: ICD-10-CM

## 2021-10-14 DIAGNOSIS — Z76.89 ENCOUNTER FOR WEIGHT MANAGEMENT: ICD-10-CM

## 2021-10-14 DIAGNOSIS — Z12.31 ENCOUNTER FOR SCREENING MAMMOGRAM FOR BREAST CANCER: ICD-10-CM

## 2021-10-14 DIAGNOSIS — E88.81 INSULIN RESISTANCE: ICD-10-CM

## 2021-10-14 DIAGNOSIS — Z87.891 PERSONAL HISTORY OF TOBACCO USE: ICD-10-CM

## 2021-10-14 DIAGNOSIS — R73.03 PREDIABETES: Primary | ICD-10-CM

## 2021-10-14 PROCEDURE — G0447 BEHAVIOR COUNSEL OBESITY 15M: HCPCS | Performed by: FAMILY MEDICINE

## 2021-10-14 PROCEDURE — 99386 PREV VISIT NEW AGE 40-64: CPT | Performed by: FAMILY MEDICINE

## 2021-10-14 RX ORDER — NALTREXONE HYDROCHLORIDE 50 MG/1
25 TABLET, FILM COATED ORAL DAILY
Qty: 30 TABLET | Refills: 1 | Status: SHIPPED | OUTPATIENT
Start: 2021-10-14 | End: 2022-01-19 | Stop reason: SDUPTHER

## 2021-10-14 RX ORDER — BUPROPION HYDROCHLORIDE 100 MG/1
100 TABLET, EXTENDED RELEASE ORAL 2 TIMES DAILY
Qty: 60 TABLET | Refills: 3 | Status: SHIPPED | OUTPATIENT
Start: 2021-10-14

## 2021-10-14 ASSESSMENT — ENCOUNTER SYMPTOMS
ANAL BLEEDING: 0
ABDOMINAL DISTENTION: 0
VOMITING: 0
CHEST TIGHTNESS: 0
SINUS PRESSURE: 0
WHEEZING: 0
NAUSEA: 0
COLOR CHANGE: 0
CONSTIPATION: 0
ABDOMINAL PAIN: 0
DIARRHEA: 0
SHORTNESS OF BREATH: 0
PHOTOPHOBIA: 0
VOICE CHANGE: 0

## 2021-10-14 NOTE — PROGRESS NOTES
[place] [address]   [address] [facility] [address]  Phone: [phone], Fax: [phone]    TELEHEALTH EVALUATION -- Audio/Visual (During EXOHN-44 public health emergency)    Patient ID verified by me prior to start of this visit    Phoebe Vail (:  1980) has requested an audio/video evaluation for the following concern(s):  Chief Complaint   Patient presents with    Establish Care    Knee Pain     LEFT/ PAIN SCORE: 0/10    Immunizations     NO TO COVID-19 VACCINE      HPI:  Phoebe Vail is an established patient of Elizabeth Thomas MD  . Patient is scheduled to establish care. Patient has history of prediabetes last A1c 6.4 2019 not done since then. Patient is on Metformin 500 mg daily reports she takes sometimes. Patient reports she has lost about 10 pounds in the last 3 months. She is working on her weight for long time but nothing has given her good results. She is watching her diet, cut back on high calorie content diet including pop. She takes low-carb diet and has increased protein diet. Patient has history of borderline PCOS and also insulin resistance. She is trying to conceive, but has no good results. Patient reports she has irregular menstrual cycles, she has seen fertility specialist also in the past.  They suggested IVF. Patient also goes to gym 3 days in a week for about 30 minutes. Patient complains of bilateral knee pain but lately her left knee was more painful, she has noticed small cyst behind her knee. Reports pain has improved on NSAIDs as needed. Patient reports she might have underlying osteoarthritis in both knees. She is struggling with her weight for long time. She has not tried any weight loss medications. Smoking patient has restarted smoking 1 year before smokes about 1 pack for 2 to 3 days. Is ready to quit. []Negative depression screening.    []1-4 = Minimal depression   []5-9 = Mild depression   []10-14 = Moderate depression   []15-19 = Moderately severe depression   []20-27 = Severe depression  PHQ Scores 10/13/2021 1/14/2021 12/24/2020 3/4/2020 9/16/2019 10/19/2017 8/2/2016   PHQ2 Score 0 0 0 0 0 0 0   PHQ9 Score 0 0 0 0 0 0 0     Review of Systems   Constitutional: Positive for activity change and unexpected weight change. Negative for appetite change, diaphoresis, fatigue and fever. HENT: Negative for congestion, nosebleeds, postnasal drip, sinus pressure and voice change. Eyes: Negative for photophobia and visual disturbance. Respiratory: Negative for chest tightness, shortness of breath and wheezing. Cardiovascular: Negative for chest pain, palpitations and leg swelling. Gastrointestinal: Negative for abdominal distention, abdominal pain, anal bleeding, constipation, diarrhea, nausea and vomiting. Genitourinary: Positive for menstrual problem. Negative for difficulty urinating, flank pain, frequency, urgency and vaginal pain. Musculoskeletal: Positive for arthralgias, gait problem and joint swelling. Negative for myalgias, neck pain and neck stiffness. Skin: Negative for color change and pallor. Neurological: Negative for dizziness, tremors, speech difficulty, weakness, light-headedness, numbness and headaches. Psychiatric/Behavioral: Negative for agitation, decreased concentration, dysphoric mood, hallucinations, sleep disturbance and suicidal ideas. The patient is nervous/anxious.         Patient Active Problem List    Diagnosis Date Noted    Insulin resistance     Metrorrhagia 10/31/2012    [facility] 40.0-44.9[facility] 10/31/2012    Family history of diabetes mellitus (DM) 10/31/2012    Acid reflux     Vision abnormalities     Bruises easily     Prediabetes 10/14/2021    PCOS (polycystic ovarian syndrome) 10/14/2021    Personal history of tobacco use 10/14/2021    Family history of breast cancer in mother 08/02/2016    Chronic pain of left knee 07/28/2015  Obesity, Class III, BMI 40-49.9 (morbid obesity) ([facility] 2015    Environmental allergies 2014        Past Surgical History:   Procedure Laterality Date    LAPAROSCOPY  2/1/10    Open    OTHER SURGICAL HISTORY  2/1/10    chromopertubation with bilateral patent fallopian tubes     Family History   Problem Relation Age of Onset    Heart Disease Father     Other Mother         pulmonary HTN, pulmonary fibrosis   Susan Lawrence Breast Cancer Mother         initially dx before age 48 & again after age 48   Susan Lawrence Other Sister         hysterectomy    Diabetes Sister         GDM    Other Sister         Hysterectomy    Other Sister         hysterectomy    Diabetes Sister     Other Sister         hysterectomy    Cancer Neg Hx     Colon Cancer Neg Hx     Eclampsia Neg Hx     Hypertension Neg Hx     Ovarian Cancer Neg Hx      Labor Neg Hx     Spont Abortions Neg Hx     Stroke Neg Hx      Current Outpatient Medications   Medication Sig Dispense Refill    buPROPion (WELLBUTRIN SR) 100 MG extended release tablet Take 1 tablet by mouth 2 times daily 60 tablet 3    naltrexone (DEPADE) 50 MG tablet Take 0.5 tablets by mouth daily 30 tablet 1    metFORMIN (GLUCOPHAGE-XR) 500 MG extended release tablet Take 1 tablet by mouth nightly 90 tablet 1    loratadine (CLARITIN) 10 MG tablet Take 1 tablet by mouth daily 30 tablet 2    folic acid (FOLVITE) 1 MG tablet Take 1 tablet by mouth daily 90 tablet 4    Prenatal Vit-Fe Fumarate-FA (PREPLUS) 27-1 MG TABS Take 1 tablet by mouth daily 90 tablet 4     No current facility-administered medications for this visit. No Known Allergies     Social History     Tobacco Use    Smoking status: Current Every Day Smoker     Packs/day: 0.25     Years: 1.00     Pack years: 0.25     Types: Cigarettes    Smokeless tobacco: Never Used    Tobacco comment: quit   Vaping Use    Vaping Use: Never used   Substance Use Topics    Alcohol use:  Yes     Alcohol/week: 0.0 standard systems is limited: Vitals/Constitutional/EENT/Resp/CV/GI//MS/Neuro/Skin/Heme-Lymph-Imm. ASSESSMENT/PLAN:  1. Prediabetes  Had long discussion with patient about the labs and also prediabetes and diabetes. Recheck A1c continue Metformin can try injectable later on.  - Hemoglobin A1C; Future  - CBC Auto Differential; Future  - Comprehensive Metabolic Panel; Future  - TSH with Reflex; Future    2. Class 3 severe obesity due to excess calories with serious comorbidity and body mass index (BMI) of 40.0 to 44.9 in [facility])  Patient is working for long time, long discussion about the weight management exercise regimen and diet. Start on Wellbutrin discussed about the side effects and also naltrexone. - Hemoglobin A1C; Future  - CBC Auto Differential; Future  - Comprehensive Metabolic Panel; Future  - Lipid Panel; Future  - Vitamin D 25 Hydroxy; Future  - TSH with Reflex; Future  - buPROPion (WELLBUTRIN SR) 100 MG extended release tablet; Take 1 tablet by mouth 2 times daily  Dispense: 60 tablet; Refill: 3  - naltrexone (DEPADE) 50 MG tablet; Take 0.5 tablets by mouth daily  Dispense: 30 tablet; Refill: 1    3. Encounter for weight management  Discussed about the medication diet and exercise regimen discussed increase exercise about 200 minutes/week    4. Chronic pain of left knee    Pain is under control likely Baker's cyst continue to monitor  5. PCOS (polycystic ovarian syndrome)  Continue Metformin weight reduction will help    6. Insulin resistance  Continue Metformin and lifestyle changes    7. Encounter for screening mammogram for breast cancer    - [facility] Digital Screen Bilateral [[id number]]; Future    8. Personal history of tobacco use      9. Preventative health care    - Hepatitis C Antibody; Future    Controlled Substance Monitoring:  Acute and Chronic Pain Monitoring:   No flowsheet data found.   Orders Placed This Encounter   Procedures    [facility] Digital Screen Bilateral [[id number]]     Standing Status: Future     Standing Expiration Date:   10/13/2022     Order Specific Question:   Reason for exam:     Answer:   screening    Hemoglobin A1C     Standing Status:   Future     Standing Expiration Date:   10/13/2022    Hepatitis C Antibody     Standing Status:   Future     Standing Expiration Date:   10/13/2022    CBC Auto Differential     Standing Status:   Future     Standing Expiration Date:   10/15/2022    Comprehensive Metabolic Panel     Fasting 8 hrs     Standing Status:   Future     Standing Expiration Date:   10/14/2022    Lipid Panel     Standing Status:   Future     Standing Expiration Date:   10/14/2022     Order Specific Question:   Is Patient Fasting?/# of Hours     Answer:   yes, 8-10 hours    Vitamin D 25 Hydroxy     Standing Status:   Future     Standing Expiration Date:   10/14/2022    TSH with Reflex     Standing Status:   Future     Standing Expiration Date:   10/14/2022      Orders Placed This Encounter   Medications    buPROPion (WELLBUTRIN SR) 100 MG extended release tablet     Sig: Take 1 tablet by mouth 2 times daily     Dispense:  60 tablet     Refill:  3    naltrexone (DEPADE) 50 MG tablet     Sig: Take 0.5 tablets by mouth daily     Dispense:  30 tablet     Refill:  1      There are no discontinued medications. Jesús Mckay received counseling on the following healthy behaviors: nutrition, exercise, medication adherence and tobacco cessation  Reviewed prior labs and health maintenance. Continue current medications, diet and exercise. Discussed use, benefit, and side effects of prescribed medications. Barriers to medication compliance addressed. Patient given educational materials - see patient instructions. All patient questions answered. Patient voiced understanding. Return in about 4 weeks (around 11/11/2021) for weight manag, lab work.     Joan Landeros is a 35 y.o. female patient  being evaluated by a Virtual Visit (video visit) encounter to address concerns as mentioned above. A caregiver was present when appropriate. Due to this being a TeleHealth encounter (During PRQPZ-82 public health emergency), evaluation of the following organ systems was limited:Vitals/Constitutional/EENT/Resp/CV/GI//MS/Neuro/Skin/Heme-Lymph-Imm. Services were provided through a video synchronous discussion virtually to substitute for in-person clinic visit. This is a telehealth visit that was performed with the originating site at Patient Location: home and provider Location of [place], [place]. Verbal consent to participate in video visit was obtained. Patient ID verified by me prior to start of this visit  I discussed with the patient the nature of our telehealth visits via interactive/real-time audio/video that:  - I would evaluate the patient and recommend diagnostics and treatments based on my assessment  - Our sessions are not being recorded and that personal health information is protected  - Our team would provide follow up care in person if/when the patient needs it. Pursuant to the emergency declaration under the [address] [place] [address] and the [employer] Act, this Virtual Visit was conducted with patient's (and/or legal guardian's) consent, to reduce the patient's risk of exposure to COVID-19 and provide necessary medical care. The patient (and/or legal guardian) has also been advised to contact this office for worsening conditions or problems, and seek emergency medical treatment and/or call 911 if deemed necessary. This note was completed by using the assistance of a speech-recognition program. However, inadvertent computerized transcription errors may be present. Although every effort was made to ensure accuracy, no guarantees can be provided that every mistake has been identified and corrected by editing.   Electronically signed by Ila Nguyen MD on 10/14/21 at 7:31 AM EDT       Obesity Counseling: Assessed behavioral health risks and factors affecting choice of behavior. Suggested weight control approaches, including dietary changes behavioral modification and follow up plan. Provided educational and support documentation.   Time spent (minutes): 55

## 2021-10-14 NOTE — PATIENT INSTRUCTIONS
Patient Education        Learning About Low-Carbohydrate Diets  What is a low-carbohydrate diet? A low-carbohydrate (or \"low-carb\") diet limits foods and drinks that have carbohydrates. This includes grains, fruits, milk and yogurt, and starchy vegetables like potatoes, beans, and corn. It also avoids foods and drinks that have added sugar. Instead, low-carb diets include foods that are high in protein and fat. Why might you follow a low-carb diet? Low-carb diets may be used for a variety of reasons, such as for weight loss. People who have diabetes may use a low-carb diet to help manage their blood sugar levels. What should you do before you start the diet? Talk to your doctor before you try any diet. This is even more important if you have health problems like kidney disease, heart disease, or diabetes. Your doctor may suggest that you meet with a registered dietitian. A dietitian can help you make an eating plan that works for you. What foods do you eat on a low-carb diet? On a low-carb diet, you choose foods that are high in protein and fat. Examples of these are:  · Meat, poultry, and fish. · Eggs. · Nuts, such as walnuts, pecans, almonds, and peanuts. · Peanut butter and other nut butters. · Tofu. · Avocado. · Leelee Torri. · Non-starchy vegetables like broccoli, cauliflower, green beans, mushrooms, peppers, lettuce, and spinach. · Unsweetened non-dairy milks like almond milk and coconut milk. · Cheese, cottage cheese, and cream cheese. Current as of: December 17, 2020               Content Version: 13.0  © 2006-2021 Healthwise, [employer]. Care instructions adapted under license by [facility] ([facility]). If you have questions about a medical condition or this instruction, always ask your healthcare professional. [address]  any warranty or liability for your use of this information. Patient Education        Learning About Low-Fat Eating  What is low-fat eating? Most food has some fat in it. Your body needs some fat to be healthy. But some kinds of fats are healthier than others. In a low-fat eating plan, you try to choose healthier fats and eat fewer unhealthy fats. Healthy fats include olive and canola oil. Try to avoid eating too much saturated fat, such as in cheese and meats. You do not need to cut all fat from your diet. But you can make healthier choices about the types and amount of fat you eat. Even though it is a good idea to choose healthier fats, it is still important to be careful of how much fat you eat, because all fats are high in calories. What are the different types of fats? Unhealthy fat  · Saturated fat. Saturated fats are mostly in animal foods, such as meat and dairy foods. Tropical oils, such as coconut oil, palm oil, and cocoa butter, are also saturated fats. Healthy fats  · Monounsaturated fat. Monounsaturated fats are liquid at room temperature but get solid when refrigerated. Eating foods that are high in this fat may help lower your \"bad\" (LDL) cholesterol, keep your \"good\" (HDL) cholesterol level up, and lower your chances of getting coronary artery disease. This fat is found in canola oil, olive oil, peanut oil, olives, avocados, nuts, and nut butters. · Polyunsaturated fat. Polyunsaturated fats are liquid at room temperature. They are in safflower, sunflower, and corn oils. They are also the main fat in seafood. Omega-3 fatty acids are types of polyunsaturated fat. Eating fish may lower your chances of getting coronary artery disease. Fatty fish such as salmon and mackerel contain these healthy fatty acids. So do ground flaxseeds and flaxseed oil, soybeans, walnuts, and seeds. Why cut down on unhealthy fats? Eating foods that contain saturated fats can raise the LDL (\"bad\") cholesterol in your blood.  Having a high level of LDL cholesterol increases your chance of hardening of the arteries (atherosclerosis), which can lead to heart disease, heart attack, and stroke. In general:  · No more than 10% of your daily calories should come from saturated fat. This is about 20 grams in a 2,000-calorie diet. · No more than 10% of your daily calories should come from polyunsaturated fat. This is about 20 grams in a 2,000-calorie diet. · Monounsaturated fats can be up to 15% of your daily calories. This is about 25 to 30 grams in a 2,000-calorie diet. If you're not sure how much fat you should be eating or how many calories you need each day to stay at a healthy weight, talk to a registered dietitian. A dietitian can help you create a plan that's right for you. What can you do to cut down on fat? Foods like cheese, butter, sausage, and desserts can have a lot of unhealthy fats. Try these tips for healthier meals at home and when you eat out. At home  · Fill up on fruits, vegetables, and whole grains. · Think of meat as a side dish instead of as the main part of your meal.  · When you do eat meat, make it extra-lean ground beef (97% lean), ground turkey breast (without skin added), meats with fat trimmed off before cooking, or skinless chicken. · Try main dishes that use whole wheat pasta, brown rice, dried beans, or vegetables. · Use cooking methods that use little or no fat, such as broiling, steaming, or grilling. Use cooking spray instead of oil. If you use oil, use a monounsaturated oil, such as canola or olive oil. · Read food labels on canned, bottled, or packaged foods. Choose those with little saturated fat. When eating out at a restaurant  · Order foods that are broiled or poached instead of fried or breaded. · Cut back on the amount of butter or margarine that you use on bread. Use small amounts of olive oil instead. · Order sauces, gravies, and salad dressings on the side, and use only a little. · When you order pasta, choose tomato sauce instead of cream sauce.   · Ask for salsa with your baked potato instead of sour cream, butter, cheese, or jean. Where can you learn more? Go to [email or web address]. org and sign in to your [employer] account. Enter C912 in the [facility] box to learn more about \"Learning About Low-Fat Eating. \"     If you do not have an account, please click on the \"Sign Up Now\" link. Current as of: December 17, 2020               Content Version: 13.0  © 2006-2021 Healthwise, [employer]. Care instructions adapted under license by [facility] ([facility]). If you have questions about a medical condition or this instruction, always ask your healthcare professional. [address] any warranty or liability for your use of this information. Learning About Low-Carbohydrate Diets  What is a low-carbohydrate diet? A low-carbohydrate (or \"low-carb\") diet limits foods and drinks that have carbohydrates. This includes grains, fruits, milk and yogurt, and starchy vegetables like potatoes, beans, and corn. It also avoids foods and drinks that have added sugar. Instead, low-carb diets include foods that are high in protein and fat. Why might you follow a low-carb diet? Low-carb diets may be used for a variety of reasons, such as for weight loss. People who have diabetes may use a low-carb diet to help manage their blood sugar levels. What should you do before you start the diet? Talk to your doctor before you try any diet. This is even more important if you have health problems like kidney disease, heart disease, or diabetes. Your doctor may suggest that you meet with a registered dietitian. A dietitian can help you make an eating plan that works for you. What foods do you eat on a low-carb diet? On a low-carb diet, you choose foods that are high in protein and fat. Examples of these are:  · Meat, poultry, and fish. · Eggs. · Nuts, such as walnuts, pecans, almonds, and peanuts. · Peanut butter and other nut butters. · Tofu. · Avocado. · Cheril Pluck.   · Non-starchy vegetables like broccoli, cauliflower, green beans, mushrooms, peppers, lettuce, and spinach. · Unsweetened non-dairy milks like almond milk and coconut milk. · Cheese, cottage cheese, and cream cheese. Current as of: December 17, 2020               Content Version: 13.0  © 2006-2021 Healthwise, Incorporated. Care instructions adapted under license by [facility] ([facility]). If you have questions about a medical condition or this instruction, always ask your healthcare professional. [address] any warranty or liability for your use of this information.

## 2021-10-25 ENCOUNTER — HOSPITAL ENCOUNTER (OUTPATIENT)
Age: 41
Discharge: HOME | End: 2021-10-25
Payer: COMMERCIAL

## 2021-10-25 ENCOUNTER — HOSPITAL ENCOUNTER (OUTPATIENT)
Dept: WOMENS IMAGING | Age: 41
Discharge: HOME | End: 2021-10-27
Payer: COMMERCIAL

## 2021-10-25 DIAGNOSIS — R73.03 PREDIABETES: ICD-10-CM

## 2021-10-25 DIAGNOSIS — Z00.00 PREVENTATIVE HEALTH CARE: ICD-10-CM

## 2021-10-25 DIAGNOSIS — E66.01 CLASS 3 SEVERE OBESITY DUE TO EXCESS CALORIES WITH SERIOUS COMORBIDITY AND BODY MASS INDEX (BMI) OF 40.0 TO 44.9 IN ADULT (HCC): ICD-10-CM

## 2021-10-25 DIAGNOSIS — Z12.31 ENCOUNTER FOR SCREENING MAMMOGRAM FOR BREAST CANCER: ICD-10-CM

## 2021-10-25 LAB
ABSOLUTE EOS #: 0.1 K/UL (ref 0–0.4)
ABSOLUTE IMMATURE GRANULOCYTE: NORMAL K/UL (ref 0–0.3)
ABSOLUTE LYMPH #: 2.2 K/UL (ref 1–4.8)
ABSOLUTE MONO #: 0.3 K/UL (ref 0.1–1.3)
ALBUMIN SERPL-MCNC: 3.9 G/DL (ref 3.5–5.2)
ALBUMIN/GLOBULIN RATIO: ABNORMAL (ref 1–2.5)
ALP BLD-CCNC: 97 U/L (ref 35–104)
ALT SERPL-CCNC: 24 U/L (ref 5–33)
ANION GAP SERPL CALCULATED.3IONS-SCNC: 9 MMOL/L (ref 9–17)
AST SERPL-CCNC: 15 U/L
BASOPHILS # BLD: 1 % (ref 0–2)
BASOPHILS ABSOLUTE: 0 K/UL (ref 0–0.2)
BILIRUB SERPL-MCNC: 0.34 MG/DL (ref 0.3–1.2)
BUN BLDV-MCNC: 14 MG/DL (ref 6–20)
BUN/CREAT BLD: ABNORMAL (ref 9–20)
CALCIUM SERPL-MCNC: 9 MG/DL (ref 8.6–10.4)
CHLORIDE BLD-SCNC: 104 MMOL/L (ref 98–107)
CHOLESTEROL/HDL RATIO: 3.9
CHOLESTEROL: 206 MG/DL
CO2: 24 MMOL/L (ref 20–31)
CREAT SERPL-MCNC: 0.71 MG/DL (ref 0.5–0.9)
DIFFERENTIAL TYPE: NORMAL
EOSINOPHILS RELATIVE PERCENT: 2 % (ref 0–4)
ESTIMATED AVERAGE GLUCOSE: 111 MG/DL
GFR AFRICAN AMERICAN: >60 ML/MIN
GFR NON-AFRICAN AMERICAN: >60 ML/MIN
GFR SERPL CREATININE-BSD FRML MDRD: ABNORMAL ML/MIN/{1.73_M2}
GFR SERPL CREATININE-BSD FRML MDRD: ABNORMAL ML/MIN/{1.73_M2}
GLUCOSE BLD-MCNC: 134 MG/DL (ref 70–99)
HBA1C MFR BLD: 5.5 % (ref 4–6)
HCT VFR BLD CALC: 41.8 % (ref 36–46)
HDLC SERPL-MCNC: 53 MG/DL
HEMOGLOBIN: 14.5 G/DL (ref 12–16)
HEPATITIS C ANTIBODY: NONREACTIVE
IMMATURE GRANULOCYTES: NORMAL %
LDL CHOLESTEROL: 117 MG/DL (ref 0–130)
LYMPHOCYTES # BLD: 40 % (ref 24–44)
MCH RBC QN AUTO: 32 PG (ref 26–34)
MCHC RBC AUTO-ENTMCNC: 34.8 G/DL (ref 31–37)
MCV RBC AUTO: 91.9 FL (ref 80–100)
MONOCYTES # BLD: 5 % (ref 1–7)
NRBC AUTOMATED: NORMAL PER 100 WBC
PDW BLD-RTO: 13 % (ref 11.5–14.9)
PLATELET # BLD: 268 K/UL (ref 150–450)
PLATELET ESTIMATE: NORMAL
PMV BLD AUTO: 7.3 FL (ref 6–12)
POTASSIUM SERPL-SCNC: 3.6 MMOL/L (ref 3.7–5.3)
RBC # BLD: 4.55 M/UL (ref 4–5.2)
RBC # BLD: NORMAL 10*6/UL
SEG NEUTROPHILS: 52 % (ref 36–66)
SEGMENTED NEUTROPHILS ABSOLUTE COUNT: 2.9 K/UL (ref 1.3–9.1)
SODIUM BLD-SCNC: 137 MMOL/L (ref 135–144)
TOTAL PROTEIN: 7.1 G/DL (ref 6.4–8.3)
TRIGL SERPL-MCNC: 179 MG/DL
TSH SERPL DL<=0.05 MIU/L-ACNC: 1.23 MIU/L (ref 0.3–5)
VITAMIN D 25-HYDROXY: 15.7 NG/ML (ref 30–100)
VLDLC SERPL CALC-MCNC: ABNORMAL MG/DL (ref 1–30)
WBC # BLD: 5.5 K/UL (ref 3.5–11)
WBC # BLD: NORMAL 10*3/UL

## 2021-10-25 PROCEDURE — 86803 HEPATITIS C AB TEST: CPT

## 2021-10-25 PROCEDURE — 80061 LIPID PANEL: CPT

## 2021-10-25 PROCEDURE — 82306 VITAMIN D 25 HYDROXY: CPT

## 2021-10-25 PROCEDURE — 84443 ASSAY THYROID STIM HORMONE: CPT

## 2021-10-25 PROCEDURE — 83036 HEMOGLOBIN GLYCOSYLATED A1C: CPT

## 2021-10-25 PROCEDURE — 36415 COLL VENOUS BLD VENIPUNCTURE: CPT

## 2021-10-25 PROCEDURE — 80053 COMPREHEN METABOLIC PANEL: CPT

## 2021-10-25 PROCEDURE — 77063 BREAST TOMOSYNTHESIS BI: CPT

## 2021-10-25 PROCEDURE — 85025 COMPLETE CBC W/AUTO DIFF WBC: CPT

## 2021-10-26 DIAGNOSIS — E55.9 VITAMIN D DEFICIENCY: Primary | ICD-10-CM

## 2021-10-26 RX ORDER — ERGOCALCIFEROL 1.25 MG/1
50000 CAPSULE ORAL WEEKLY
Qty: 12 CAPSULE | Refills: 1 | Status: SHIPPED | OUTPATIENT
Start: 2021-10-26

## 2021-11-15 ENCOUNTER — HOSPITAL ENCOUNTER (OUTPATIENT)
Dept: GENERAL RADIOLOGY | Facility: CLINIC | Age: 41
Discharge: HOME | End: 2021-11-17
Payer: COMMERCIAL

## 2021-11-15 ENCOUNTER — HOSPITAL ENCOUNTER (OUTPATIENT)
Facility: CLINIC | Age: 41
Discharge: HOME | End: 2021-11-17
Payer: COMMERCIAL

## 2021-11-15 ENCOUNTER — OFFICE VISIT (OUTPATIENT)
Dept: FAMILY MEDICINE CLINIC | Age: 41
End: 2021-11-15
Payer: COMMERCIAL

## 2021-11-15 VITALS
TEMPERATURE: 98.2 F | DIASTOLIC BLOOD PRESSURE: 80 MMHG | WEIGHT: 237.8 LBS | HEART RATE: 96 BPM | SYSTOLIC BLOOD PRESSURE: 118 MMHG | OXYGEN SATURATION: 97 % | BODY MASS INDEX: 43.76 KG/M2 | HEIGHT: 62 IN

## 2021-11-15 DIAGNOSIS — R11.0 NAUSEA: ICD-10-CM

## 2021-11-15 DIAGNOSIS — R73.03 PREDIABETES: ICD-10-CM

## 2021-11-15 DIAGNOSIS — Z76.89 ENCOUNTER FOR WEIGHT MANAGEMENT: ICD-10-CM

## 2021-11-15 DIAGNOSIS — G89.29 CHRONIC PAIN OF LEFT KNEE: ICD-10-CM

## 2021-11-15 DIAGNOSIS — E66.01 CLASS 3 SEVERE OBESITY DUE TO EXCESS CALORIES WITH SERIOUS COMORBIDITY AND BODY MASS INDEX (BMI) OF 40.0 TO 44.9 IN ADULT (HCC): Primary | ICD-10-CM

## 2021-11-15 DIAGNOSIS — M25.562 CHRONIC PAIN OF LEFT KNEE: ICD-10-CM

## 2021-11-15 DIAGNOSIS — E88.81 INSULIN RESISTANCE: ICD-10-CM

## 2021-11-15 DIAGNOSIS — E78.2 MIXED HYPERLIPIDEMIA: ICD-10-CM

## 2021-11-15 PROCEDURE — 99214 OFFICE O/P EST MOD 30 MIN: CPT | Performed by: FAMILY MEDICINE

## 2021-11-15 PROCEDURE — 73560 X-RAY EXAM OF KNEE 1 OR 2: CPT

## 2021-11-15 RX ORDER — PHENTERMINE HYDROCHLORIDE 37.5 MG/1
37.5 TABLET ORAL
Qty: 30 TABLET | Refills: 0 | Status: SHIPPED | OUTPATIENT
Start: 2021-11-15 | End: 2021-12-15

## 2021-11-15 RX ORDER — ONDANSETRON 4 MG/1
4 TABLET, FILM COATED ORAL EVERY 6 HOURS PRN
Qty: 24 TABLET | Refills: 0 | Status: SHIPPED | OUTPATIENT
Start: 2021-11-15 | End: 2021-11-21

## 2021-11-15 RX ORDER — FAMOTIDINE 20 MG/1
20 TABLET, FILM COATED ORAL 2 TIMES DAILY
Qty: 60 TABLET | Refills: 3 | Status: SHIPPED | OUTPATIENT
Start: 2021-11-15

## 2021-11-15 ASSESSMENT — ENCOUNTER SYMPTOMS
COLOR CHANGE: 0
CONSTIPATION: 0
SINUS PRESSURE: 0
VOMITING: 0
ABDOMINAL DISTENTION: 0
CHEST TIGHTNESS: 0
BACK PAIN: 0
DIARRHEA: 0
SHORTNESS OF BREATH: 0
RHINORRHEA: 0
WHEEZING: 0
COUGH: 0
PHOTOPHOBIA: 0

## 2021-11-15 NOTE — PROGRESS NOTES
Chief Complaint   Patient presents with    Weight Management    Discuss Labs    Knee Pain     starts hurting when going to gym so stopped          Rao Tracey  here today for follow up on chronic medical problems, go over labs and/or diagnostic studies, and medication refills. Weight Management, Discuss Labs, and Knee Pain (starts hurting when going to gym so stopped )      HPI: Patient is here for follow-up on weight management and to start Adipex. She has blood work done, that showed hyperlipidemia, she is on Metformin able to tolerate. She also has PCOS and insulin resistance. She was started on Wellbutrin and naltrexone reports she did not start that. She got scared of side effects. Patient is willing to quit smoking. Patient wants to start Adipex. Reports she was going to gym 3 days in a week but has stopped because of knee pain. Patient feels pain in the left knee swelling, worsening with bending kneeling. Patient feels cyst in the back of the knee. She takes Tylenol as needed. Never had any x-rays done. Wants to lose weight. Rao Tracey has made a substantial good blaire effort to lose weight in a regimen for weight reduction based on caloric restriction, nutritional changes, and exercise  Rao Tracey reports she did: Adipex in the past, exercise dietary changes. Contraindications to controlled substance anorexiant: NONE ( EtOH, drug abuse, pregnancy, BMI < 27 without co morbidities, if patient did not make substantial effort to lose weight)     Rao Tracey reports no  use of illegal drug substances  Debbie Keenan reports alcohol use of : Occasional use      Urine drug test done none    LMP:    Contraceptive method:    Patient informed that when prescribed a controlled substance for weight loss, the provider is required by law to see the patient for an appointment every thirty days.  This is neccessary to record the weight and blood pressure and to assess patient's efforts to lose weight, and to ensure there are no contraindications or adverse effects. Patient advised contraception during the time of taking this medication, advised no alcohol use during this time    OARRS done today and okay  Plan: diet, exercise and start Adipex          blood pressure is normal    BP Readings from Last 3 Encounters:   11/15/21 118/80   03/04/20 120/70   09/16/19 126/84        Pulse is Normal.  Pulse Readings from Last 3 Encounters:   11/15/21 96   12/24/20 90   03/04/20 87         Wt Readings from Last 3 Encounters:   11/15/21 237 lb 12.8 oz (107.9 kg)   12/24/20 245 lb (111.1 kg)   05/29/20 250 lb (113.4 kg)         Body mass index is 43.49 kg/m². Exercise Tracking - Detailed 10/14/2021   Exercise Classes / Videos Duration 30   Exercise Classes / Videos Intensity Moderate   Exercise Classes / Videos Times/Week 3   Total Exercise Minutes/Week 90           /80   Pulse 96   Temp 98.2 °F (36.8 °C) (Temporal)   Ht 5' 2\" (1.575 m)   Wt 237 lb 12.8 oz (107.9 kg)   LMP 11/09/2021 (Exact Date)   SpO2 97%   BMI 43.49 kg/m²    Body mass index is 43.49 kg/m². Wt Readings from Last 3 Encounters:   11/15/21 237 lb 12.8 oz (107.9 kg)   12/24/20 245 lb (111.1 kg)   05/29/20 250 lb (113.4 kg)        [x]Negative depression screening. PHQ Scores 10/13/2021 1/14/2021 12/24/2020 3/4/2020 9/16/2019 10/19/2017 8/2/2016   PHQ2 Score 0 0 0 0 0 0 0   PHQ9 Score 0 0 0 0 0 0 0      []1-4 = Minimal depression   []5-9 = Milddepression   []10-14 = Moderate depression   []15-19 = Moderately severe depression   []20-27 = Severe depression    Discussed testing with the patient and all questions fully answered.     Hospital Outpatient Visit on 10/25/2021   Component Date Value Ref Range Status    Glucose 10/25/2021 134* 70 - 99 mg/dL Final    BUN 10/25/2021 14  6 - 20 mg/dL Final    CREATININE 10/25/2021 0.71  0.50 - 0.90 mg/dL Final    Bun/Cre Ratio 10/25/2021 Desirable   150-199  Borderline   200-499  High     >499   Very high   Based on AHA Guidelines for fasting triglyceride, October 2012.  VLDL 10/25/2021 NOT REPORTED* 1 - 30 mg/dL Final    Vit D, 25-Hydroxy 10/25/2021 15.7* 30.0 - 100.0 ng/mL Final    Comment:    Reference Range:  Vitamin D status         Range   Deficiency              <20 ng/mL   Mild Deficiency       20-30 ng/mL   Sufficiency           ng/mL   Toxicity               >100 ng/mL      TSH 10/25/2021 1.23  0.30 - 5.00 mIU/L Final    Hemoglobin A1C 10/25/2021 5.5  4.0 - 6.0 % Final    Estimated Avg Glucose 10/25/2021 111  mg/dL Final    Comment: The ADA and AACC recommend providing the estimated average glucose result to permit better   patient understanding of their HBA1c result.  Hepatitis C Ab 10/25/2021 NONREACTIVE  NONREACTIVE Final    Comment:       The hepatitis C procedure used in our laboratory is a Chemiluminescent test specific for   three recombinant HCV antigens. A negative anti-HCV result indicates that the antibodies to   hepatitis C virus are not present at this time. Individuals with reactive anti-HCV should be considered infected and infectious until proven   otherwise. Confirmation of all equivocal or reactive results is recommended by ordering   HCV RNA by PCR.       WBC 10/25/2021 5.5  3.5 - 11.0 k/uL Final    RBC 10/25/2021 4.55  4.0 - 5.2 m/uL Final    Hemoglobin 10/25/2021 14.5  12.0 - 16.0 g/dL Final    Hematocrit 10/25/2021 41.8  36 - 46 % Final    MCV 10/25/2021 91.9  80 - 100 fL Final    MCH 10/25/2021 32.0  26 - 34 pg Final    MCHC 10/25/2021 34.8  31 - 37 g/dL Final    RDW 10/25/2021 13.0  11.5 - 14.9 % Final    Platelets 54/63/1244 268  150 - 450 k/uL Final    MPV 10/25/2021 7.3  6.0 - 12.0 fL Final    NRBC Automated 10/25/2021 NOT REPORTED  per 100 WBC Final    Differential Type 10/25/2021 NOT REPORTED   Final    Seg Neutrophils 10/25/2021 52  36 - 66 % Final    Lymphocytes 10/25/2021 40  24 - 44 % Final    Monocytes 10/25/2021 5  1 - 7 % Final    Eosinophils % 10/25/2021 2  0 - 4 % Final    Basophils 10/25/2021 1  0 - 2 % Final    Immature Granulocytes 10/25/2021 NOT REPORTED  0 % Final    Segs Absolute 10/25/2021 2.90  1.3 - 9.1 k/uL Final    Absolute Lymph # 10/25/2021 2.20  1.0 - 4.8 k/uL Final    Absolute Mono # 10/25/2021 0.30  0.1 - 1.3 k/uL Final    Absolute Eos # 10/25/2021 0.10  0.0 - 0.4 k/uL Final    Basophils Absolute 10/25/2021 0.00  0.0 - 0.2 k/uL Final    Absolute Immature Granulocyte 10/25/2021 NOT REPORTED  0.00 - 0.30 k/uL Final    WBC Morphology 10/25/2021 NOT REPORTED   Final    RBC Morphology 10/25/2021 NOT REPORTED   Final    Platelet Estimate 18/72/3860 NOT REPORTED   Final         Most recent labs reviewed:     Lab Results   Component Value Date    WBC 5.5 10/25/2021    HGB 14.5 10/25/2021    HCT 41.8 10/25/2021    MCV 91.9 10/25/2021     10/25/2021       @BRIEFLAB(NA,K,CL,CO2,BUN,CREATININE,GLUCOSE,CALCIUM)@     Lab Results   Component Value Date    ALT 24 10/25/2021    AST 15 10/25/2021    ALKPHOS 97 10/25/2021    BILITOT 0.34 10/25/2021       Lab Results   Component Value Date    TSH 1.23 10/25/2021       Lab Results   Component Value Date    CHOL 206 (H) 10/25/2021    CHOL 215 (H) 09/26/2014     Lab Results   Component Value Date    TRIG 179 (H) 10/25/2021    TRIG 212 (H) 09/26/2014     Lab Results   Component Value Date    HDL 53 10/25/2021    HDL 60 09/19/2019    HDL 55 09/26/2014     Lab Results   Component Value Date    LDLCHOLESTEROL 117 10/25/2021    LDLCHOLESTEROL 102 09/19/2019    LDLCHOLESTEROL 118 09/26/2014     Lab Results   Component Value Date    VLDL NOT REPORTED (H) 10/25/2021    VLDL NOT REPORTED 09/19/2019    VLDL NOT REPORTED 09/26/2014     Lab Results   Component Value Date    CHOLHDLRATIO 3.9 10/25/2021    CHOLHDLRATIO 3.2 09/19/2019    CHOLHDLRATIO 3.9 09/26/2014       Lab Results   Component Value Date    LABA1C 5.5 10/25/2021       No results found for: DOYSEAVR48    No results found for: FOLATE    No results found for: IRON, TIBC, FERRITIN    Lab Results   Component Value Date    VITD25 15.7 (L) 10/25/2021             Current Outpatient Medications   Medication Sig Dispense Refill    phentermine (ADIPEX-P) 37.5 MG tablet Take 1 tablet by mouth every morning (before breakfast) for 30 days. 30 tablet 0    Elastic Bandages & Supports (KNEE BRACE/HINGED/LARGE) MISC Use daily for knee pain 1 each 0    ondansetron (ZOFRAN) 4 MG tablet Take 1 tablet by mouth every 6 hours as needed for Nausea or Vomiting 24 tablet 0    famotidine (PEPCID) 20 MG tablet Take 1 tablet by mouth 2 times daily 60 tablet 3    vitamin D (ERGOCALCIFEROL) 1.25 MG (12011 UT) CAPS capsule Take 1 capsule by mouth once a week 12 capsule 1    metFORMIN (GLUCOPHAGE-XR) 500 MG extended release tablet Take 1 tablet by mouth nightly 90 tablet 1    folic acid (FOLVITE) 1 MG tablet Take 1 tablet by mouth daily 90 tablet 4    Prenatal Vit-Fe Fumarate-FA (PREPLUS) 27-1 MG TABS Take 1 tablet by mouth daily 90 tablet 4    buPROPion (WELLBUTRIN SR) 100 MG extended release tablet Take 1 tablet by mouth 2 times daily (Patient not taking: Reported on 11/15/2021) 60 tablet 3    naltrexone (DEPADE) 50 MG tablet Take 0.5 tablets by mouth daily (Patient not taking: Reported on 11/15/2021) 30 tablet 1    loratadine (CLARITIN) 10 MG tablet Take 1 tablet by mouth daily (Patient not taking: Reported on 11/15/2021) 30 tablet 2     No current facility-administered medications for this visit.              Social History     Socioeconomic History    Marital status: Single     Spouse name: Not on file    Number of children: Not on file    Years of education: Not on file    Highest education level: Not on file   Occupational History    Not on file   Tobacco Use    Smoking status: Current Every Day Smoker     Packs/day: 0.25     Years: 1.00     Pack years: 0.25     Types: Cigarettes    Smokeless tobacco: Never Used    Tobacco comment: quit   Vaping Use    Vaping Use: Never used   Substance and Sexual Activity    Alcohol use: Yes     Alcohol/week: 0.0 standard drinks     Comment: Occassional    Drug use: No    Sexual activity: Yes     Partners: Male   Other Topics Concern    Not on file   Social History Narrative    Not on file     Social Determinants of Health     Financial Resource Strain: Low Risk     Difficulty of Paying Living Expenses: Not hard at all   Food Insecurity: No Food Insecurity    Worried About Running Out of Food in the Last Year: Never true    [address] in the Last Year: Never true   Transportation Needs:     Lack of Transportation (Medical): Not on file    Lack of Transportation (Non-Medical):  Not on file   Physical Activity:     Days of Exercise per Week: Not on file    Minutes of Exercise per Session: Not on file   Stress:     Feeling of Stress : Not on file   Social Connections:     Frequency of Communication with Friends and Family: Not on file    Frequency of Social Gatherings with Friends and Family: Not on file    Attends Alevism Services: Not on file    Active Member of [address] [place] [address] or Organizations: Not on file    Attends Club or Organization Meetings: Not on file    Marital Status: Not on file   Intimate Partner Violence:     Fear of Current or Ex-Partner: Not on file    Emotionally Abused: Not on file    Physically Abused: Not on file    Sexually Abused: Not on file   Housing Stability:     Unable to Pay for Housing in the Last Year: Not on file    Number of [place] in the Last Year: Not on file    Unstable Housing in the Last Year: Not on file     Ready to quit: No  Counseling given: Yes  Comment: quit        Family History   Problem Relation Age of Onset    Heart Disease Father     Other Mother         pulmonary HTN, pulmonary fibrosis    Breast Cancer Mother         initially dx before age 48 & again after age 48  Other Sister         hysterectomy    Diabetes Sister         GDM    Other Sister         Hysterectomy    Other Sister         hysterectomy    Diabetes Sister     Other Sister         hysterectomy    Cancer Neg Hx     Colon Cancer Neg Hx     Eclampsia Neg Hx     Hypertension Neg Hx     Ovarian Cancer Neg Hx      Labor Neg Hx     Spont Abortions Neg Hx     Stroke Neg Hx              -rest of complaints with corresponding details per ROS    The patient's past medical, surgical, social, and family history as well as her current medications and allergies were reviewed as documented intoday's encounter. Review of Systems   Constitutional: Positive for unexpected weight change. Negative for activity change, appetite change, diaphoresis and fatigue. HENT: Negative for congestion, postnasal drip, rhinorrhea and sinus pressure. Eyes: Negative for photophobia and visual disturbance. Respiratory: Negative for cough, chest tightness, shortness of breath and wheezing. Cardiovascular: Negative for chest pain, palpitations and leg swelling. Gastrointestinal: Negative for abdominal distention, constipation, diarrhea and vomiting. Endocrine: Negative for polyuria. Genitourinary: Negative for frequency and urgency. Musculoskeletal: Positive for arthralgias and joint swelling. Negative for back pain, gait problem, neck pain and neck stiffness. Skin: Negative for color change. Neurological: Negative for dizziness, speech difficulty, weakness, numbness and headaches. Psychiatric/Behavioral: Negative for agitation, decreased concentration, hallucinations and sleep disturbance. The patient is nervous/anxious. Physical Exam  Vitals and nursing note reviewed. Constitutional:       Appearance: Normal appearance. She is obese. HENT:      Head: Normocephalic and atraumatic.       Nose: Nose normal.      Mouth/Throat:      Mouth: Mucous membranes are moist.   Eyes: Extraocular Movements: Extraocular movements intact. Pupils: Pupils are equal, round, and reactive to light. Cardiovascular:      Rate and Rhythm: Normal rate and regular rhythm. Heart sounds: Normal heart sounds. Pulmonary:      Effort: Pulmonary effort is normal.      Breath sounds: Normal breath sounds. No wheezing or rhonchi. Abdominal:      General: Bowel sounds are normal.      Palpations: Abdomen is soft. There is no mass. Tenderness: There is no abdominal tenderness. There is no guarding or rebound. Musculoskeletal:      Left knee: No deformity or crepitus. Normal range of motion. No tenderness. Legs:       Comments: Small varicosity of the left knee   Neurological:      Mental Status: She is alert and oriented to person, place, and time. Cranial Nerves: Cranial nerves are intact. No dysarthria. Sensory: Sensation is intact. No sensory deficit. Motor: No weakness or tremor. Psychiatric:         Attention and Perception: Attention normal.         Mood and Affect: Mood normal. Mood is not anxious or depressed. Speech: She is communicative. ASSESSMENT AND PLAN      1. Class 3 severe obesity due to excess calories with serious comorbidity and body mass index (BMI) of 40.0 to 44.9 in [facility])  Discussed with patient he can start Wellbutrin naltrexone and see how you are tolerating. Start on Adipex discussed with the side effects. Continue exercise. - phentermine (ADIPEX-P) 37.5 MG tablet; Take 1 tablet by mouth every morning (before breakfast) for 30 days. Dispense: 30 tablet; Refill: 0    2. Prediabetes  Continue Metformin start on Adipex  - phentermine (ADIPEX-P) 37.5 MG tablet; Take 1 tablet by mouth every morning (before breakfast) for 30 days. Dispense: 30 tablet; Refill: 0    3. Mixed hyperlipidemia  Weight reduction recommended and discussed with patient    4.  Encounter for weight management    - phentermine (ADIPEX-P) 37.5 MG tablet; Take 1 tablet by mouth every morning (before breakfast) for 30 days. Dispense: 30 tablet; Refill: 0    5. Chronic pain of left knee  X-rays knee brace NSAIDs as needed  - Elastic Bandages & Supports (KNEE BRACE/HINGED/LARGE) MISC; Use daily for knee pain  Dispense: 1 each; Refill: 0  - XR KNEE LEFT (1-2 VIEWS); Future    6. Insulin resistance  Continue Metformin    7. Nausea  Zofran and Pepcid as needed  - ondansetron (ZOFRAN) 4 MG tablet; Take 1 tablet by mouth every 6 hours as needed for Nausea or Vomiting  Dispense: 24 tablet; Refill: 0  - famotidine (PEPCID) 20 MG tablet; Take 1 tablet by mouth 2 times daily  Dispense: 60 tablet; Refill: 3      Orders Placed This Encounter   Procedures    XR KNEE LEFT (1-2 VIEWS)     Standing Status:   Future     Standing Expiration Date:   11/15/2022     Order Specific Question:   Reason for exam:     Answer:   pain         There are no discontinued medications. Zulma Patel received counseling on the following healthy behaviors: nutrition, exercise, medication adherence and tobacco cessation  Reviewed prior labs and health maintenance  Continue current medications, diet and exercise. Discussed use, benefit, and side effects of prescribed medications. Barriers to medication compliance addressed. Patient given educational materials - see patient instructions  Was a self-tracking handout given in paper form or via [facility]? Yes    Requested Prescriptions     Signed Prescriptions Disp Refills    phentermine (ADIPEX-P) 37.5 MG tablet 30 tablet 0     Sig: Take 1 tablet by mouth every morning (before breakfast) for 30 days.     Elastic Bandages & Supports (KNEE BRACE/HINGED/LARGE) MISC 1 each 0     Sig: Use daily for knee pain    ondansetron (ZOFRAN) 4 MG tablet 24 tablet 0     Sig: Take 1 tablet by mouth every 6 hours as needed for Nausea or Vomiting    famotidine (PEPCID) 20 MG tablet 60 tablet 3     Sig: Take 1 tablet by mouth 2 times daily       All patient questions answered. Patient voiced understanding. Quality Measures    Body mass index is 43.49 kg/m². Elevated. Weight control planned discussed daily exercise regimen and Healthy diet and regular exercise. BP: 118/80 Blood pressure is normal. Treatment plan consists of Weight Reduction, DASH Eating Plan, Dietary Sodium Restriction, Increased Physical Activity and No treatment change needed. Lab Results   Component Value Date    LDLCHOLESTEROL 117 10/25/2021    (goal LDL reduction with dx if diabetes is 50% LDL reduction)      PHQ Scores 10/13/2021 1/14/2021 12/24/2020 3/4/2020 9/16/2019 10/19/2017 8/2/2016   PHQ2 Score 0 0 0 0 0 0 0   PHQ9 Score 0 0 0 0 0 0 0     Interpretation of Total Score Depression Severity: 1-4 = Minimal depression, 5-9 = Mild depression, 10-14 = Moderate depression, 15-19 = Moderately severe depression, 20-27 = Severe depression    The patient'spast medical, surgical, social, and family history as well as her   current medications and allergies were reviewed as documented in today's encounter. Medications, labs, diagnostic studies, consultations andfollow-up as documented in this encounter. Return in about 4 weeks (around 12/13/2021) for [address] Patient wasseen with total face to face time of 30 minutes. More than 50% of this visit was counseling and education. Future Appointments   Date Time Provider Julissa Coates   12/15/2021  1:15 PM Leslee Zheng MD [id number]     This note was completed by using the assistance of a speech-recognition program. However, inadvertent computerized transcription errors may be present. Althoughevery effort was made to ensure accuracy, no guarantees can be provided that every mistake has been identified and corrected by editing.   Electronically signed by Leslee Zheng MD on 11/15/2021  1:58 PM

## 2021-11-15 NOTE — PROGRESS NOTES
Visit Information    Have you changed or started any medications since your last visit including any over-the-counter medicines, vitamins, or herbal medicines? no   Are you having any side effects from any of your medications? -  no  Have you stopped taking any of your medications? Is so, why? -  no    Have you seen any other physician or provider since your last visit? No  Have you had any other diagnostic tests since your last visit? Yes - Records Obtained  Have you been seen in the emergency room and/or had an admission to a hospital since we last saw you? No  Have you had your routine dental cleaning in the past 6 months? no    Have you activated your [employer] account? If not, what are your barriers?  Yes     Patient Care Team:  Haley Livingston MD as PCP - General (Family Medicine)  Haley Livingston MD as PCP - [facility] Provider  Nathalia Melton DO as Consulting Physician (Obstetrics & Gynecology)    Medical History Review  Past Medical, Family, and Social History reviewed and does contribute to the patient presenting condition    Health Maintenance   Topic Date Due    Pneumococcal 0-64 years Vaccine (1 of 2 - PPSV23) Never done    COVID-19 Vaccine (1) Never done    DTaP/Tdap/Td vaccine (1 - Tdap) Never done    Cervical cancer screen  01/15/2018    Flu vaccine (1) Never done    A1C test (Diabetic or Prediabetic)  10/25/2022    Breast cancer screen  10/25/2022    Lipid screen  10/25/2026    Hepatitis C screen  Completed    HIV screen  Completed    Hepatitis A vaccine  Aged Out    Hepatitis B vaccine  Aged Out    Hib vaccine  Aged Out    Meningococcal (ACWY) vaccine  Aged Out

## 2021-12-15 ENCOUNTER — TELEMEDICINE (OUTPATIENT)
Dept: FAMILY MEDICINE CLINIC | Age: 41
End: 2021-12-15
Payer: COMMERCIAL

## 2021-12-15 DIAGNOSIS — E28.2 PCOS (POLYCYSTIC OVARIAN SYNDROME): ICD-10-CM

## 2021-12-15 DIAGNOSIS — R03.0 ELEVATED BP WITHOUT DIAGNOSIS OF HYPERTENSION: ICD-10-CM

## 2021-12-15 DIAGNOSIS — Z76.89 ENCOUNTER FOR WEIGHT MANAGEMENT: ICD-10-CM

## 2021-12-15 DIAGNOSIS — E78.2 MIXED HYPERLIPIDEMIA: ICD-10-CM

## 2021-12-15 DIAGNOSIS — R73.03 PREDIABETES: ICD-10-CM

## 2021-12-15 DIAGNOSIS — E88.81 INSULIN RESISTANCE: ICD-10-CM

## 2021-12-15 DIAGNOSIS — E66.01 CLASS 3 SEVERE OBESITY DUE TO EXCESS CALORIES WITH SERIOUS COMORBIDITY AND BODY MASS INDEX (BMI) OF 40.0 TO 44.9 IN ADULT (HCC): Primary | ICD-10-CM

## 2021-12-15 DIAGNOSIS — Z87.891 PERSONAL HISTORY OF TOBACCO USE: ICD-10-CM

## 2021-12-15 PROCEDURE — 99214 OFFICE O/P EST MOD 30 MIN: CPT | Performed by: FAMILY MEDICINE

## 2021-12-15 RX ORDER — PHENTERMINE HYDROCHLORIDE 37.5 MG/1
37.5 TABLET ORAL
Qty: 30 TABLET | Refills: 0 | Status: SHIPPED | OUTPATIENT
Start: 2021-12-15 | End: 2022-01-14

## 2021-12-15 RX ORDER — BLOOD PRESSURE TEST KIT
KIT MISCELLANEOUS
Qty: 1 KIT | Refills: 0 | Status: SHIPPED | OUTPATIENT
Start: 2021-12-15

## 2021-12-15 ASSESSMENT — ENCOUNTER SYMPTOMS
DIARRHEA: 0
WHEEZING: 0
PHOTOPHOBIA: 0
SHORTNESS OF BREATH: 0
BACK PAIN: 0
ABDOMINAL DISTENTION: 0
COLOR CHANGE: 0
CHEST TIGHTNESS: 0
VOMITING: 0
CONSTIPATION: 0
NAUSEA: 0
COUGH: 0
ABDOMINAL PAIN: 0
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
## 2022-01-19 ENCOUNTER — NURSE ONLY (OUTPATIENT)
Dept: FAMILY MEDICINE CLINIC | Age: 42
End: 2022-01-19

## 2022-01-19 ENCOUNTER — HOSPITAL ENCOUNTER (OUTPATIENT)
Age: 42
Setting detail: SPECIMEN
Discharge: HOME | End: 2022-01-19

## 2022-01-19 ENCOUNTER — TELEMEDICINE (OUTPATIENT)
Dept: FAMILY MEDICINE CLINIC | Age: 42
End: 2022-01-19
Payer: COMMERCIAL

## 2022-01-19 DIAGNOSIS — Z87.891 PERSONAL HISTORY OF TOBACCO USE: ICD-10-CM

## 2022-01-19 DIAGNOSIS — G89.29 CHRONIC PAIN OF LEFT KNEE: ICD-10-CM

## 2022-01-19 DIAGNOSIS — E66.01 CLASS 3 SEVERE OBESITY DUE TO EXCESS CALORIES WITH SERIOUS COMORBIDITY AND BODY MASS INDEX (BMI) OF 40.0 TO 44.9 IN ADULT (HCC): Primary | ICD-10-CM

## 2022-01-19 DIAGNOSIS — Z76.89 ENCOUNTER FOR WEIGHT MANAGEMENT: ICD-10-CM

## 2022-01-19 DIAGNOSIS — R03.0 ELEVATED BP WITHOUT DIAGNOSIS OF HYPERTENSION: ICD-10-CM

## 2022-01-19 DIAGNOSIS — M25.562 CHRONIC PAIN OF LEFT KNEE: ICD-10-CM

## 2022-01-19 DIAGNOSIS — Z20.822 SUSPECTED COVID-19 VIRUS INFECTION: ICD-10-CM

## 2022-01-19 DIAGNOSIS — R73.03 PREDIABETES: ICD-10-CM

## 2022-01-19 DIAGNOSIS — E78.2 MIXED HYPERLIPIDEMIA: ICD-10-CM

## 2022-01-19 PROCEDURE — 99214 OFFICE O/P EST MOD 30 MIN: CPT | Performed by: FAMILY MEDICINE

## 2022-01-19 RX ORDER — GUAIFENESIN 600 MG/1
600 TABLET, EXTENDED RELEASE ORAL 2 TIMES DAILY
Qty: 30 TABLET | Refills: 0 | Status: SHIPPED | OUTPATIENT
Start: 2022-01-19

## 2022-01-19 RX ORDER — LORATADINE 10 MG/1
10 TABLET ORAL DAILY
Qty: 30 TABLET | Refills: 0 | Status: SHIPPED | OUTPATIENT
Start: 2022-01-19 | End: 2022-02-03 | Stop reason: SDUPTHER

## 2022-01-19 RX ORDER — BLOOD PRESSURE TEST KIT
KIT MISCELLANEOUS
Qty: 1 KIT | Refills: 0 | Status: SHIPPED | OUTPATIENT
Start: 2022-01-19

## 2022-01-19 RX ORDER — NALTREXONE HYDROCHLORIDE 50 MG/1
25 TABLET, FILM COATED ORAL DAILY
Qty: 30 TABLET | Refills: 1 | Status: SHIPPED | OUTPATIENT
Start: 2022-01-19

## 2022-01-19 RX ORDER — DEXAMETHASONE 6 MG/1
6 TABLET ORAL
Qty: 10 TABLET | Refills: 0 | Status: SHIPPED | OUTPATIENT
Start: 2022-01-19 | End: 2022-01-29

## 2022-01-19 RX ORDER — FLUTICASONE PROPIONATE 50 MCG
2 SPRAY, SUSPENSION (ML) NASAL DAILY
Qty: 16 G | Refills: 0 | Status: SHIPPED | OUTPATIENT
Start: 2022-01-19

## 2022-01-19 ASSESSMENT — ENCOUNTER SYMPTOMS
CHEST TIGHTNESS: 0
SORE THROAT: 1
SINUS PAIN: 1
VOMITING: 0
RHINORRHEA: 1
ABDOMINAL PAIN: 0
BACK PAIN: 0
CONSTIPATION: 0
COUGH: 1
ABDOMINAL DISTENTION: 0
SINUS PRESSURE: 1
WHEEZING: 0
NAUSEA: 0
COLOR CHANGE: 0
SHORTNESS OF BREATH: 0
RECTAL PAIN: 0

## 2022-01-19 NOTE — PROGRESS NOTES
Visit Information    Have you changed or started any medications since your last visit including any over-the-counter medicines, vitamins, or herbal medicines? no   Are you having any side effects from any of your medications? -  no  Have you stopped taking any of your medications? Is so, why? -  no    Have you seen any other physician or provider since your last visit? No  Have you had any other diagnostic tests since your last visit? No  Have you been seen in the emergency room and/or had an admission to a hospital since we last saw you? No  Have you had your routine dental cleaning in the past 6 months? no    Have you activated your [employer] account? If not, what are your barriers?  Yes     Patient Care Team:  Mariely Brown MD as PCP - General (Family Medicine)  Mariely Brown MD as PCP - [facility]  Margarita Hoyt DO as Consulting Physician (Obstetrics & Gynecology)    Medical History Review  Past Medical, Family, and Social History reviewed and does contribute to the patient presenting condition    Health Maintenance   Topic Date Due    COVID-19 Vaccine (1) Never done    Pneumococcal 0-64 years Vaccine (1 of 2 - PPSV23) Never done    DTaP/Tdap/Td vaccine (1 - Tdap) Never done    Cervical cancer screen  01/15/2018    Flu vaccine (1) Never done    Depression Screen  10/13/2022    A1C test (Diabetic or Prediabetic)  10/25/2022    Breast cancer screen  10/25/2022    Lipid screen  10/25/2026    Hepatitis C screen  Completed    HIV screen  Completed    Hepatitis A vaccine  Aged Out    Hepatitis B vaccine  Aged Out    Hib vaccine  Aged Out    Meningococcal (ACWY) vaccine  Aged Out

## 2022-01-19 NOTE — PROGRESS NOTES
[address] [address]  Phone: [phone], Fax: [phone]    TELEHEALTH EVALUATION -- Audio/Visual (During WLXZQ-55 public health emergency)    Patient ID verified by me prior to start of this visit    Cristina Mansfield (:  1980) has requested an audio/video evaluation for the following concern(s):  Chief Complaint   Patient presents with    Weight Management     covid like symptoms     Health Maintenance     Pt declined      HPI:  Cristina Mansfield is an established patient of Anny Manning MD  . Patient has a history of obesity scheduled for weight management. Patient reports she is not feeling well today she started having symptoms of fatigue sore throat sinus congestion fever chills yesterday. She works as a . Patient is not vaccinated against COVID. Patient reports she did rapid test at home which was negative. She denies any chest pain shortness of breath, wheezing. She has mild dry cough. Obesity improving, patient has lost about 7 pounds since last 2 months being on Wellbutrin and naltrexone. Patient reports compliance but has not refilled her naltrexone in December. Patient denies any side effects able to tolerate. Patient reports she is trying to watch her diet and also trying to be physically more active. She has prediabetes hyperlipidemia. On metformin reports compliance. Hyperlipidemia on weight management. Patient has elevated blood pressure has blood pressure monitor ordered she has not received that. Chronic left knee pain has not received knee brace. [x]Negative depression screening.    []1-4 = Minimal depression   []5-9 = Mild depression   []10-14 = Moderate depression   []15-19 = Moderately severe depression   []20-27 = Severe depression  PHQ Scores 10/13/2021 2021 2020 3/4/2020 2019 10/19/2017 2016   PHQ2 Score 0 0 0 0 0 0 0   PHQ9 Score 0 0 0 0 0 0 0     Review of Systems   Constitutional: Positive for appetite change and unexpected weight change. Negative for activity change, fatigue and fever. HENT: Positive for congestion, rhinorrhea, sinus pressure, sinus pain and sore throat. Negative for mouth sores and nosebleeds. Eyes: Negative for visual disturbance. Respiratory: Positive for cough. Negative for chest tightness, shortness of breath and wheezing. Cardiovascular: Negative for chest pain, palpitations and leg swelling. Gastrointestinal: Negative for abdominal distention, abdominal pain, constipation, nausea, rectal pain and vomiting. Endocrine: Negative for polyuria. Genitourinary: Negative for difficulty urinating, frequency, hematuria, urgency and vaginal pain. Musculoskeletal: Positive for arthralgias and myalgias. Negative for back pain, neck pain and neck stiffness. Skin: Negative for color change. Neurological: Negative for dizziness, weakness, light-headedness, numbness and headaches. Psychiatric/Behavioral: Negative for agitation, decreased concentration, hallucinations and sleep disturbance. The patient is nervous/anxious.         Patient Active Problem List    Diagnosis Date Noted    Insulin resistance     Metrorrhagia 10/31/2012    BMI 40.0-44.9, adult ([facility] [address] 10/31/2012    Family history of diabetes mellitus (DM) 10/31/2012    Acid reflux     Vision abnormalities     Bruises easily     Mixed hyperlipidemia 11/15/2021    Prediabetes 10/14/2021    PCOS (polycystic ovarian syndrome) 10/14/2021    Personal history of tobacco use 10/14/2021    Family history of breast cancer in mother 08/02/2016    Chronic pain of left knee 07/28/2015    Class 3 severe obesity due to excess calories with serious comorbidity and body mass index (BMI) of 40.0 to 44.9 in adult [facility]) 07/28/2015    Environmental allergies 09/26/2014        Past Surgical History:   Procedure Laterality Date    LAPAROSCOPY  2/1/10    Open    OTHER SURGICAL HISTORY  2/1/10    chromopertubation with bilateral patent fallopian tubes     Family History   Problem Relation Age of Onset    Heart Disease Father     Other Mother         pulmonary HTN, pulmonary fibrosis   [place] Breast Cancer Mother         initially dx before age 48 & again after age 48   [place] Other Sister         hysterectomy    Diabetes Sister         GDM    Other Sister         Hysterectomy    Other Sister         hysterectomy    Diabetes Sister     Other Sister         hysterectomy    Cancer Neg Hx     Colon Cancer Neg Hx     Eclampsia Neg Hx     Hypertension Neg Hx     Ovarian Cancer Neg Hx      Labor Neg Hx     Spont Abortions Neg Hx     Stroke Neg Hx      Current Outpatient Medications   Medication Sig Dispense Refill    dexamethasone (DECADRON) 6 MG tablet Take 1 tablet by mouth daily (with breakfast) for 10 days 10 tablet 0    fluticasone (FLONASE) 50 MCG/ACT nasal spray 2 sprays by Nasal route daily 16 g 0    guaiFENesin (MUCINEX) 600 MG extended release tablet Take 1 tablet by mouth 2 times daily 30 tablet 0    loratadine (CLARITIN) 10 MG tablet Take 1 tablet by mouth daily 30 tablet 0    naltrexone (DEPADE) 50 MG tablet Take 0.5 tablets by mouth daily 30 tablet 1    Blood Pressure KIT Dx: HTN. Needs automatic blood pressure machine to monitor her blood pressure. 1 kit 0    Elastic Bandages & Supports (KNEE BRACE/HINGED/LARGE) MISC Use daily for knee pain 1 each 0    Blood Pressure KIT Dx: HTN. Needs automatic blood pressure machine to monitor her blood pressure.  1 kit 0    famotidine (PEPCID) 20 MG tablet Take 1 tablet by mouth 2 times daily 60 tablet 3    vitamin D (ERGOCALCIFEROL) 1.25 MG (27341 UT) CAPS capsule Take 1 capsule by mouth once a week 12 capsule 1    buPROPion (WELLBUTRIN SR) 100 MG extended release tablet Take 1 tablet by mouth 2 times daily 60 tablet 3    metFORMIN (GLUCOPHAGE-XR) 500 MG extended release tablet Take 1 tablet by mouth nightly 90 tablet 1    loratadine (CLARITIN) 10 MG tablet Take 1 tablet by mouth daily 30 tablet 2    folic acid (FOLVITE) 1 MG tablet Take 1 tablet by mouth daily 90 tablet 4    Prenatal Vit-Fe Fumarate-FA (PREPLUS) 27-1 MG TABS Take 1 tablet by mouth daily 90 tablet 4     No current facility-administered medications for this visit. No Known Allergies     Social History     Tobacco Use    Smoking status: Current Every Day Smoker     Packs/day: 0.25     Years: 1.00     Pack years: 0.25     Types: Cigarettes    Smokeless tobacco: Never Used    Tobacco comment: quit   Vaping Use    Vaping Use: Never used   Substance Use Topics    Alcohol use: Yes     Alcohol/week: 0.0 standard drinks     Comment: Occassional    Drug use: No        PHYSICAL EXAMINATION:  Vital Signs: (As obtained by patient/caregiver or practitioner observation)  Patient-Reported Vitals 1/19/2022   Patient-Reported Weight 231   Patient-Reported Height 52        Constitutional: [x] Appears well-developed and well-nourished [x] No apparent distress      [] Abnormal-   Mental status  [x] Alert and awake  [x] Oriented to person/place/time [x]Able to follow commands      Eyes:  EOM    [x]  Normal  [] Abnormal-  Sclera  [x]  Normal  [] Abnormal -         Discharge [x]  None visible  [] Abnormal -    HENT:   [x] Normocephalic, atraumatic.   [] Abnormal   [x] Mouth/Throat: Mucous membranes are moist.     External Ears [x] Normal  [] Abnormal-     Neck: [x] No visualized mass     Pulmonary/Chest: [x] Respiratory effort normal.  [x] No visualized signs of difficulty breathing or respiratory distress        [] Abnormal mild dry cough    Musculoskeletal:   [x] Normal gait with no signs of ataxia         [x] Normal range of motion of neck        [] Abnormal-     Neurological:        [x] No Facial Asymmetry (Cranial nerve 7 motor function) (limited exam to video visit)          [x] No gaze palsy        [] Abnormal-     Skin:        [x] No significant exanthematous lesions or discoloration noted on facial skin         [] Abnormal-     Psychiatric:       [x] Normal Affect [x] No Hallucinations        [x] Abnormal- Anxious, with pressured speech    Other pertinent observable physical exam findings-   Lab Results   Component Value Date    WBC 5.5 10/25/2021    HGB 14.5 10/25/2021    HCT 41.8 10/25/2021    MCV 91.9 10/25/2021     10/25/2021     Lab Results   Component Value Date     10/25/2021    K 3.6 10/25/2021     10/25/2021    CO2 24 10/25/2021    BUN 14 10/25/2021    CREATININE 0.71 10/25/2021    GLUCOSE 134 10/25/2021    CALCIUM 9.0 10/25/2021        Due to this being a TeleHealth encounter, evaluation of the following organ systems is limited: Vitals/Constitutional/EENT/Resp/CV/GI//MS/Neuro/Skin/Heme-Lymph-Imm. ASSESSMENT/PLAN:  1. Class 3 severe obesity due to excess calories with serious comorbidity and body mass index (BMI) of 40.0 to 44.9 in [facility])  Patient has lost 7 pounds continue Wellbutrin and naltrexone continue physical activity watch your diet. - naltrexone (DEPADE) 50 MG tablet; Take 0.5 tablets by mouth daily  Dispense: 30 tablet; Refill: 1    2. Mixed hyperlipidemia  Continue weight reduction. 3. Encounter for weight management  Continue Wellbutrin naltrexone, continue weight reduction. Continue lifestyle changes    4. Prediabetes  Continue metformin    5. Suspected COVID-19 virus infection  Work note provided recheck for COVID contact number provided for testing. Discussed to monitor symptoms and call back if it gets worse  - COVID-19; Future  - dexamethasone (DECADRON) 6 MG tablet; Take 1 tablet by mouth daily (with breakfast) for 10 days  Dispense: 10 tablet; Refill: 0  - fluticasone (FLONASE) 50 MCG/ACT nasal spray; 2 sprays by Nasal route daily  Dispense: 16 g; Refill: 0  - guaiFENesin (MUCINEX) 600 MG extended release tablet; Take 1 tablet by mouth 2 times daily  Dispense: 30 tablet;  Refill: 0  - Answer:   No     Order Specific Question:   Previously tested for COVID-19? Answer:   Yes      Orders Placed This Encounter   Medications    dexamethasone (DECADRON) 6 MG tablet     Sig: Take 1 tablet by mouth daily (with breakfast) for 10 days     Dispense:  10 tablet     Refill:  0    fluticasone (FLONASE) 50 MCG/ACT nasal spray     Si sprays by Nasal route daily     Dispense:  16 g     Refill:  0    guaiFENesin (MUCINEX) 600 MG extended release tablet     Sig: Take 1 tablet by mouth 2 times daily     Dispense:  30 tablet     Refill:  0    loratadine (CLARITIN) 10 MG tablet     Sig: Take 1 tablet by mouth daily     Dispense:  30 tablet     Refill:  0    naltrexone (DEPADE) 50 MG tablet     Sig: Take 0.5 tablets by mouth daily     Dispense:  30 tablet     Refill:  1    Blood Pressure KIT     Sig: Dx: HTN. Needs automatic blood pressure machine to monitor her blood pressure. Dispense:  1 kit     Refill:  0    Elastic Bandages & Supports (KNEE BRACE/HINGED/LARGE) MISC     Sig: Use daily for knee pain     Dispense:  1 each     Refill:  0      Medications Discontinued During This Encounter   Medication Reason    naltrexone (DEPADE) 50 MG tablet REORDER    Elastic Bandages & Supports (KNEE BRACE/HINGED/LARGE) MISC REORDER      Debbie received counseling on the following healthy behaviors: nutrition, exercise and medication adherence  Reviewed prior labs and health maintenance. Continue current medications, diet and exercise. Discussed use, benefit, and side effects of prescribed medications. Barriers to medication compliance addressed. Patient given educational materials - see patient instructions. All patient questions answered. Patient voiced understanding. Return in about 3 months (around 2022). Clarence Ray is a 35 y.o. female patient  being evaluated by a Virtual Visit (video visit) encounter to address concerns as mentioned above.   A caregiver was present when appropriate. Due to this being a TeleHealth encounter (During LTXTH-15 public health emergency), evaluation of the following organ systems was limited:Vitals/Constitutional/EENT/Resp/CV/GI//MS/Neuro/Skin/Heme-Lymph-Imm. Services were provided through a video synchronous discussion virtually to substitute for in-person clinic visit. This is a telehealth visit that was performed with the originating site at Patient Location: home and provider Location of [place], [place]. Verbal consent to participate in video visit was obtained. Patient ID verified by me prior to start of this visit  I discussed with the patient the nature of our telehealth visits via interactive/real-time audio/video that:  - I would evaluate the patient and recommend diagnostics and treatments based on my assessment  - Our sessions are not being recorded and that personal health information is protected  - Our team would provide follow up care in person if/when the patient needs it. Pursuant to the emergency declaration under the [address] [place] [address] and the [employer] Act, this Virtual Visit was conducted with patient's (and/or legal guardian's) consent, to reduce the patient's risk of exposure to COVID-19 and provide necessary medical care. The patient (and/or legal guardian) has also been advised to contact this office for worsening conditions or problems, and seek emergency medical treatment and/or call 911 if deemed necessary. This note was completed by using the assistance of a speech-recognition program. However, inadvertent computerized transcription errors may be present. Although every effort was made to ensure accuracy, no guarantees can be provided that every mistake has been identified and corrected by editing.   Electronically signed by Dimitris Quinones MD on 1/19/22 at 8:30 AM EST

## 2022-01-20 LAB
SARS-COV-2: NORMAL
SARS-COV-2: NOT DETECTED
SOURCE: NORMAL

## 2022-01-31 ENCOUNTER — TELEPHONE (OUTPATIENT)
Dept: FAMILY MEDICINE CLINIC | Age: 42
End: 2022-01-31

## 2022-01-31 DIAGNOSIS — G44.001 INTRACTABLE CLUSTER HEADACHE SYNDROME, UNSPECIFIED CHRONICITY PATTERN: Primary | ICD-10-CM

## 2022-01-31 RX ORDER — SUMATRIPTAN 50 MG/1
50 TABLET, FILM COATED ORAL
Qty: 9 TABLET | Refills: 3 | Status: SHIPPED | OUTPATIENT
Start: 2022-01-31 | End: 2022-02-03

## 2022-01-31 NOTE — TELEPHONE ENCOUNTER
Patient states ever since she started consistently taking depade that she notices she gets headaches daily and they are pretty bad. She takes Ibuprofen and it will go away for a little bit but then it comes right back. She does not see you again until April. Please advise.

## 2022-02-03 ENCOUNTER — VIRTUAL VISIT (OUTPATIENT)
Dept: FAMILY MEDICINE CLINIC | Age: 42
End: 2022-02-03
Payer: COMMERCIAL

## 2022-02-03 DIAGNOSIS — F41.9 ANXIETY: Primary | ICD-10-CM

## 2022-02-03 PROCEDURE — 99213 OFFICE O/P EST LOW 20 MIN: CPT | Performed by: FAMILY MEDICINE

## 2022-02-03 ASSESSMENT — PATIENT HEALTH QUESTIONNAIRE - PHQ9
SUM OF ALL RESPONSES TO PHQ QUESTIONS 1-9: 0
2. FEELING DOWN, DEPRESSED OR HOPELESS: 0
SUM OF ALL RESPONSES TO PHQ9 QUESTIONS 1 & 2: 0
SUM OF ALL RESPONSES TO PHQ QUESTIONS 1-9: 0
1. LITTLE INTEREST OR PLEASURE IN DOING THINGS: 0

## 2022-02-03 ASSESSMENT — ENCOUNTER SYMPTOMS
ABDOMINAL DISTENTION: 0
SHORTNESS OF BREATH: 0
COUGH: 0

## 2022-02-03 NOTE — PROGRESS NOTES
[address] [address]  Phone: [phone], Fax: [phone]    TELEHEALTH EVALUATION -- Audio/Visual (During XRTHQ-58 public health emergency)    Patient ID verified by me prior to start of this visit    Tyler Wild (:  1980) has requested an audio/video evaluation for the following concern(s):  Chief Complaint   Patient presents with    Anxiety     CALLED OFF FOR WORK      HPI:  Tyler Wild is an established patient of Kevin Hernandez MD   Patient scheduled appointment for work note. Patient reports she called off work today because of her anxiety. Patient reports she [place] 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 Patient has history of anxiety which gets worse. Patient reports she tries to avoid highways because of her anxiety. She never discussed this before, reports she does not need any medications. Patient reports her work needs a note for today. [x]Negative depression screening. []1-4 = Minimal depression   []5-9 = Mild depression   []10-14 = Moderate depression   []15-19 = Moderately severe depression   []20-27 = Severe depression  PHQ Scores 2/3/2022 10/13/2021 2021 2020 3/4/2020 2019 10/19/2017   PHQ2 Score 0 0 0 0 0 0 0   PHQ9 Score 0 0 0 0 0 0 0     Review of Systems   Constitutional: Negative for activity change and unexpected weight change. HENT: Negative for congestion. Eyes: Negative for visual disturbance. Respiratory: Negative for cough and shortness of breath. Cardiovascular: Negative for chest pain and leg swelling. Gastrointestinal: Negative for abdominal distention. Skin: Negative for rash. Neurological: Negative for numbness and headaches. Psychiatric/Behavioral: Positive for behavioral problems.  Negative for agitation, decreased concentration, dysphoric mood, sleep disturbance and suicidal ideas. The patient is nervous/anxious.         Patient Active Problem List    Diagnosis Date Noted    Insulin resistance     Metrorrhagia 10/31/2012    BMI 40.0-44.9, adult ([facility] [address] 10/31/2012    Family history of diabetes mellitus (DM) 10/31/2012    Acid reflux     Vision abnormalities     Bruises easily     Anxiety 2022    Mixed hyperlipidemia 11/15/2021    Prediabetes 10/14/2021    PCOS (polycystic ovarian syndrome) 10/14/2021    Personal history of tobacco use 10/14/2021    Family history of breast cancer in mother 2016    Chronic pain of left knee 2015    Class 3 severe obesity due to excess calories with serious comorbidity and body mass index (BMI) of 40.0 to 44.9 in adult [facility]) 2015    Environmental allergies 2014        Past Surgical History:   Procedure Laterality Date    LAPAROSCOPY  2/1/10    Open    OTHER SURGICAL HISTORY  2/1/10    chromopertubation with bilateral patent fallopian tubes     Family History   Problem Relation Age of Onset    Heart Disease Father     Other Mother         pulmonary HTN, pulmonary fibrosis   [place] Breast Cancer Mother         initially dx before age 48 & again after age 48   [employer] Other Sister         hysterectomy    Diabetes Sister         GDM    Other Sister         Hysterectomy    Other Sister         hysterectomy    Diabetes Sister     Other Sister         hysterectomy    Cancer Neg Hx     Colon Cancer Neg Hx     Eclampsia Neg Hx     Hypertension Neg Hx     Ovarian Cancer Neg Hx      Labor Neg Hx     Spont Abortions Neg Hx     Stroke Neg Hx      Current Outpatient Medications   Medication Sig Dispense Refill    SUMAtriptan (IMITREX) 50 MG tablet Take 1 tablet by mouth once as needed for Migraine 9 tablet 3    fluticasone (FLONASE) 50 MCG/ACT nasal spray 2 sprays by Nasal route daily 16 g 0    guaiFENesin (MUCINEX) 600 MG extended release tablet Take 1 tablet by mouth 2 times daily 30 tablet 0    naltrexone (DEPADE) 50 MG tablet Take 0.5 tablets by mouth daily 30 tablet 1    Elastic Bandages & Supports (KNEE BRACE/HINGED/LARGE) MISC Use daily for knee pain 1 each 0    famotidine (PEPCID) 20 MG tablet Take 1 tablet by mouth 2 times daily 60 tablet 3    vitamin D (ERGOCALCIFEROL) 1.25 MG (04878 UT) CAPS capsule Take 1 capsule by mouth once a week 12 capsule 1    buPROPion (WELLBUTRIN SR) 100 MG extended release tablet Take 1 tablet by mouth 2 times daily 60 tablet 3    metFORMIN (GLUCOPHAGE-XR) 500 MG extended release tablet Take 1 tablet by mouth nightly 90 tablet 1    loratadine (CLARITIN) 10 MG tablet Take 1 tablet by mouth daily 30 tablet 2    folic acid (FOLVITE) 1 MG tablet Take 1 tablet by mouth daily 90 tablet 4    Prenatal Vit-Fe Fumarate-FA (PREPLUS) 27-1 MG TABS Take 1 tablet by mouth daily 90 tablet 4    Blood Pressure KIT Dx: HTN. Needs automatic blood pressure machine to monitor her blood pressure. (Patient not taking: Reported on 2/3/2022) 1 kit 0    Blood Pressure KIT Dx: HTN. Needs automatic blood pressure machine to monitor her blood pressure. (Patient not taking: Reported on 2/3/2022) 1 kit 0     No current facility-administered medications for this visit. No Known Allergies     Social History     Tobacco Use    Smoking status: Current Every Day Smoker     Packs/day: 0.25     Years: 1.00     Pack years: 0.25     Types: Cigarettes    Smokeless tobacco: Never Used    Tobacco comment: quit   Vaping Use    Vaping Use: Never used   Substance Use Topics    Alcohol use:  Yes     Alcohol/week: 0.0 standard drinks     Comment: Occassional    Drug use: No        PHYSICAL EXAMINATION:  Vital Signs: (As obtained by patient/caregiver or practitioner observation)  Patient-Reported Vitals 2/3/2022   Patient-Reported Weight 230 LB   Patient-Reported Height 5 2        Constitutional: [x] Appears well-developed and well-nourished [x] No apparent distress      [] Abnormal-   Mental status  [x] Alert and awake  [x] Oriented to person/place/time [x]Able to follow commands      Eyes:  EOM    [x]  Normal  [] Abnormal-  Sclera  [x]  Normal  [] Abnormal -         Discharge [x]  None visible  [] Abnormal -    HENT:   [x] Normocephalic, atraumatic. [] Abnormal   [x] Mouth/Throat: Mucous membranes are moist.     External Ears [x] Normal  [] Abnormal-     Neck: [x] No visualized mass     Pulmonary/Chest: [x] Respiratory effort normal.  [x] No visualized signs of difficulty breathing or respiratory distress        [] Abnormal     Musculoskeletal:   [x] Normal gait with no signs of ataxia         [x] Normal range of motion of neck        [] Abnormal-     Neurological:        [x] No Facial Asymmetry (Cranial nerve 7 motor function) (limited exam to video visit)          [x] No gaze palsy        [] Abnormal-     Skin:        [x] No significant exanthematous lesions or discoloration noted on facial skin         [] Abnormal-     Psychiatric:       [x] Normal Affect [x] No Hallucinations        [x] Abnormal- Anxious, with pressured speech    Other pertinent observable physical exam findings-   Lab Results   Component Value Date    WBC 5.5 10/25/2021    HGB 14.5 10/25/2021    HCT 41.8 10/25/2021    MCV 91.9 10/25/2021     10/25/2021     Lab Results   Component Value Date     10/25/2021    K 3.6 10/25/2021     10/25/2021    CO2 24 10/25/2021    BUN 14 10/25/2021    CREATININE 0.71 10/25/2021    GLUCOSE 134 10/25/2021    CALCIUM 9.0 10/25/2021        Due to this being a TeleHealth encounter, evaluation of the following organ systems is limited: Vitals/Constitutional/EENT/Resp/CV/GI//MS/Neuro/Skin/Heme-Lymph-Imm. ASSESSMENT/PLAN:  1. Anxiety  Work note provided for today    Controlled Substance Monitoring:  Acute and Chronic Pain Monitoring:   No flowsheet data found. No orders of the defined types were placed in this encounter.      No orders of the defined types were placed in this encounter. Medications Discontinued During This Encounter   Medication Reason    loratadine (CLARITIN) 10 MG tablet [place] Members received counseling on the following healthy behaviors: nutrition and exercise  Reviewed prior labs and health maintenance. Continue current medications, diet and exercise. Discussed use, benefit, and side effects of prescribed medications. Barriers to medication compliance addressed. Patient given educational materials - see patient instructions. All patient questions answered. Patient voiced understanding. No follow-ups on file. Eugenio Thakur is a 35 y.o. female patient  being evaluated by a Virtual Visit (video visit) encounter to address concerns as mentioned above. A caregiver was present when appropriate. Due to this being a TeleHealth encounter (During EWEKC-11 public health emergency), evaluation of the following organ systems was limited:Vitals/Constitutional/EENT/Resp/CV/GI//MS/Neuro/Skin/Heme-Lymph-Imm. Services were provided through a video synchronous discussion virtually to substitute for in-person clinic visit. This is a telehealth visit that was performed with the originating site at Patient Location: home and provider Location of [place], [place]. Verbal consent to participate in video visit was obtained. Patient ID verified by me prior to start of this visit  I discussed with the patient the nature of our telehealth visits via interactive/real-time audio/video that:  - I would evaluate the patient and recommend diagnostics and treatments based on my assessment  - Our sessions are not being recorded and that personal health information is protected  - Our team would provide follow up care in person if/when the patient needs it.      Pursuant to the emergency declaration under the [address] [place] [address] authority and the [employer] Act, this Virtual Visit was conducted with patient's (and/or legal guardian's) consent, to reduce the patient's risk of exposure to COVID-19 and provide necessary medical care. The patient (and/or legal guardian) has also been advised to contact this office for worsening conditions or problems, and seek emergency medical treatment and/or call 911 if deemed necessary. This note was completed by using the assistance of a speech-recognition program. However, inadvertent computerized transcription errors may be present. Although every effort was made to ensure accuracy, no guarantees can be provided that every mistake has been identified and corrected by editing.   Electronically signed by Ana Lemons MD on 2/3/22 at 10:53 AM EST

## 2022-04-01 ENCOUNTER — HOSPITAL ENCOUNTER (OUTPATIENT)
Age: 42
Discharge: HOME | End: 2022-04-01
Payer: COMMERCIAL

## 2022-04-01 LAB
ABO/RH: NORMAL
ANTIBODY SCREEN: NEGATIVE
BLOOD BANK COMMENT: NORMAL
HEPATITIS B SURFACE ANTIGEN: NONREACTIVE
HEPATITIS C ANTIBODY: NONREACTIVE
PROLACTIN: 11.51 NG/ML (ref 4.79–23.3)
RUBV IGG SER QL: 78.3 IU/ML
TSH SERPL DL<=0.05 MIU/L-ACNC: 1.12 UIU/ML (ref 0.3–5)
VITAMIN D 25-HYDROXY: 18.6 NG/ML

## 2022-04-01 PROCEDURE — 87591 N.GONORRHOEAE DNA AMP PROB: CPT

## 2022-04-01 PROCEDURE — 87340 HEPATITIS B SURFACE AG IA: CPT

## 2022-04-01 PROCEDURE — 81241 F5 GENE: CPT

## 2022-04-01 PROCEDURE — 87389 HIV-1 AG W/HIV-1&-2 AB AG IA: CPT

## 2022-04-01 PROCEDURE — 86762 RUBELLA ANTIBODY: CPT

## 2022-04-01 PROCEDURE — 83520 IMMUNOASSAY QUANT NOS NONAB: CPT

## 2022-04-01 PROCEDURE — 84443 ASSAY THYROID STIM HORMONE: CPT

## 2022-04-01 PROCEDURE — 81291 MTHFR GENE: CPT

## 2022-04-01 PROCEDURE — 86850 RBC ANTIBODY SCREEN: CPT

## 2022-04-01 PROCEDURE — 86787 VARICELLA-ZOSTER ANTIBODY: CPT

## 2022-04-01 PROCEDURE — 82306 VITAMIN D 25 HYDROXY: CPT

## 2022-04-01 PROCEDURE — 86900 BLOOD TYPING SEROLOGIC ABO: CPT

## 2022-04-01 PROCEDURE — 87491 CHLMYD TRACH DNA AMP PROBE: CPT

## 2022-04-01 PROCEDURE — 86593 SYPHILIS TEST NON-TREP QUANT: CPT

## 2022-04-01 PROCEDURE — 86901 BLOOD TYPING SEROLOGIC RH(D): CPT

## 2022-04-01 PROCEDURE — 86704 HEP B CORE ANTIBODY TOTAL: CPT

## 2022-04-01 PROCEDURE — 83036 HEMOGLOBIN GLYCOSYLATED A1C: CPT

## 2022-04-01 PROCEDURE — 36415 COLL VENOUS BLD VENIPUNCTURE: CPT

## 2022-04-01 PROCEDURE — 84146 ASSAY OF PROLACTIN: CPT

## 2022-04-01 PROCEDURE — 86803 HEPATITIS C AB TEST: CPT

## 2022-04-02 LAB — HEPATITIS B CORE TOTAL ANTIBODY: NONREACTIVE

## 2022-04-03 LAB
ESTIMATED AVERAGE GLUCOSE: 128 MG/DL
HBA1C MFR BLD: 6.1 % (ref 4–6)

## 2022-04-04 LAB
C. TRACHOMATIS DNA ,URINE: NEGATIVE
N. GONORRHOEAE DNA, URINE: NEGATIVE
RPR TITER: NORMAL
SPECIMEN DESCRIPTION: NORMAL
VZV IGG SER QL IA: 2.36

## 2022-04-05 LAB — HIV AG/AB: NONREACTIVE

## 2022-04-06 LAB
ANTI-MULLERIAN HORMONE: 0.36 NG/ML
FACTOR V MUTATION: NEGATIVE
SPECIMEN: NORMAL

## 2022-04-07 LAB
MTHFR INTERPRETATION: NORMAL
MTHFR MUTATION A1286C: NEGATIVE
MTHFR MUTATION C665T: NEGATIVE
SPECIMEN: NORMAL

## 2022-06-29 ENCOUNTER — HOSPITAL ENCOUNTER (OUTPATIENT)
Age: 42
Setting detail: SPECIMEN
Discharge: HOME | End: 2022-06-29

## 2022-06-29 ENCOUNTER — OFFICE VISIT (OUTPATIENT)
Dept: FAMILY MEDICINE CLINIC | Age: 42
End: 2022-06-29
Payer: COMMERCIAL

## 2022-06-29 VITALS
TEMPERATURE: 98.2 F | SYSTOLIC BLOOD PRESSURE: 99 MMHG | DIASTOLIC BLOOD PRESSURE: 63 MMHG | OXYGEN SATURATION: 98 % | HEART RATE: 82 BPM

## 2022-06-29 DIAGNOSIS — Z91.89 AT INCREASED RISK OF EXPOSURE TO COVID-19 VIRUS: Primary | ICD-10-CM

## 2022-06-29 DIAGNOSIS — Z91.89 AT INCREASED RISK OF EXPOSURE TO COVID-19 VIRUS: ICD-10-CM

## 2022-06-29 PROCEDURE — 99213 OFFICE O/P EST LOW 20 MIN: CPT

## 2022-06-29 ASSESSMENT — ENCOUNTER SYMPTOMS
RHINORRHEA: 0
EYE PAIN: 0
PHOTOPHOBIA: 0
SCALP TENDERNESS: 0
COUGH: 0
BACK PAIN: 1
NAUSEA: 0
ABDOMINAL PAIN: 0
SORE THROAT: 0
SINUS PRESSURE: 0
EYE WATERING: 0
VOMITING: 0
VISUAL CHANGE: 0
EYE REDNESS: 0
SWOLLEN GLANDS: 0
BLURRED VISION: 0
FACIAL SWEATING: 0

## 2022-06-29 NOTE — LETTER
[facility]  [address]  Phone: [phone]  Fax: [phone]    MEREDITH Boss NP        June 29, 2022     Patient: [place] Estrada   YOB: 1980   Date of Visit: 6/29/2022       To Whom It May Concern: It is my medical opinion that [place] should remain out of work until [employer] are back. Patient may return to work if [place] results are negative. If COVID results are positive then patient must remain in isolation until 7/4/2022 per CDC guidelines. If you have any questions or concerns, please don't hesitate to call.     Sincerely,        MEREDITH Boss NP

## 2022-06-29 NOTE — PROGRESS NOTES
[address] [address]  Dept: [phone]  Dept Fax: [phone]    Paola Francois is a 35 y.o. female who presents to the urgent care today for her medical conditions/complaints as notedbelow. Paola Farncois is c/o of Headache (onset yesterday and did a at home covid test +) and Concern For COVID-19      HPI:     COVID vaccine? No  Patient reports that she performed a at home COVID test and was positive  Patient reports that headache has resolved today but muscle aches are still there    Headache   This is a new problem. The current episode started yesterday (Patient states that she did not feel good at work). The problem occurs constantly (yesterday it was constant). The problem has been gradually improving. The pain is located in the bilateral region. The pain does not radiate. The pain quality is similar to prior headaches. The quality of the pain is described as aching and band-like. Associated symptoms include back pain and muscle aches. Pertinent negatives include no abdominal pain, abnormal behavior, anorexia, blurred vision, coughing, dizziness, drainage, ear pain, eye pain, eye redness, eye watering, facial sweating, fever, insomnia, loss of balance, nausea, neck pain, numbness, photophobia, rhinorrhea, scalp tenderness, seizures, sinus pressure, sore throat, swollen glands, tingling, tinnitus, visual change, vomiting or weakness. The symptoms are aggravated by bright light. She has tried NSAIDs (darkening room) for the symptoms. The treatment provided moderate relief. Her past medical history is significant for obesity. There is no history of cancer, hypertension, immunosuppression, sinus disease or TMJ.        Past Medical History:   Diagnosis Date    Acid reflux     Anxiety 2/3/2022    Bruises easily     Insulin resistance     Mixed hyperlipidemia 11/15/2021 vision, photophobia, pain, redness and visual disturbance. Respiratory: Negative for cough. Gastrointestinal: Negative for abdominal pain, anorexia, nausea and vomiting. Genitourinary: Negative for difficulty urinating and dysuria. Musculoskeletal: Positive for back pain and myalgias. Negative for neck pain. Allergic/Immunologic: Positive for environmental allergies. Neurological: Positive for headaches. Negative for dizziness, tingling, seizures, weakness, light-headedness, numbness and loss of balance. Psychiatric/Behavioral: The patient does not have insomnia. All other systems reviewed and are negative. 14 systems reviewed and negative except as listed in HPI. Objective:     Physical Exam  Vitals reviewed. Constitutional:       General: She is not in acute distress. Appearance: Normal appearance. She is not ill-appearing, toxic-appearing or diaphoretic. Comments: Patient is very well appearing   HENT:      Head: Normocephalic. Right Ear: Tympanic membrane normal.      Left Ear: Tympanic membrane normal.      Nose: Nose normal. No congestion or rhinorrhea. Mouth/Throat:      Mouth: Mucous membranes are moist.      Pharynx: Oropharynx is clear. No oropharyngeal exudate or posterior oropharyngeal erythema. Eyes:      General:         Right eye: No discharge. Left eye: No discharge. Conjunctiva/sclera: Conjunctivae normal.      Pupils: Pupils are equal, round, and reactive to light. Cardiovascular:      Rate and Rhythm: Normal rate and regular rhythm. Heart sounds: Normal heart sounds. Pulmonary:      Effort: Pulmonary effort is normal. No respiratory distress. Breath sounds: Normal breath sounds. No stridor. No wheezing, rhonchi or rales. Chest:      Chest wall: No tenderness. Musculoskeletal:         General: Normal range of motion. Cervical back: Normal range of motion and neck supple. No rigidity or tenderness.    Lymphadenopathy: Cervical: No cervical adenopathy. Skin:     General: Skin is warm and dry. Capillary Refill: Capillary refill takes less than 2 seconds. Neurological:      Mental Status: She is alert and oriented to person, place, and time. Psychiatric:         Mood and Affect: Mood normal.         Behavior: Behavior normal.         Thought Content: Thought content normal.         Judgment: Judgment normal.       BP 99/63 (Site: Left Upper Arm, Position: Sitting, Cuff Size: Large Adult)   Pulse 82   Temp 98.2 °F (36.8 °C) (Infrared)   SpO2 98%     Assessment:       Diagnosis Orders   1. At increased risk of exposure to COVID-19 virus  COVID-19       Plan:   -Will send out COVID19 testing. Possible treatment alterations based on the results.  -Patient instructed to self-quarantine until testing results are back.  -Patient instructed not to return to work until results are back.  -work note provided  -symptomatic treatment, continue ibuprofen for headache  -increase fluids and rest  -Tylenol as needed for fever/pain.  -Encouraged adequate hydration and rest.  -The patient indicates understanding of these issues and agrees with the plan.  -Educational materials provided on AVS.  -Follow up if symptoms do not improve/worsen. Discussed symptoms that will warrant urgent ED evaluation/treatment. Return if symptoms worsen or fail to improve. No orders of the defined types were placed in this encounter. Patient given educational materials - see patient instructions. Discussed use, benefit, and side effects of prescribed medications. All patient questions answered. Pt voiced understanding.     Electronically signed by MEREDITH Boss NP on 6/29/2022 at 12:52 PM

## 2022-06-29 NOTE — PATIENT INSTRUCTIONS
Patient Education        Upper Respiratory Infection (Cold): Care Instructions  Overview     An upper respiratory infection, or URI, is an infection of the nose, sinuses, or throat. URIs are spread by coughs, sneezes, and direct contact. The common cold is the most frequent kind of URI. The flu and sinus infections are otherkinds of URIs. Almost all URIs are caused by viruses. Antibiotics won't cure them. But you can treat most infections with home care. This may include drinking lots of fluids and taking over-the-counter pain medicine. You will probably feel better in 4to 10 days. Follow-up care is a key part of your treatment and safety. Be sure to make and go to all appointments, and call your doctor if you are having problems. It's also a good idea to know your test results and keep alist of the medicines you take. How can you care for yourself at home?  To prevent dehydration, drink plenty of fluids. Choose water and other clear liquids until you feel better. If you have kidney, heart, or liver disease and have to limit fluids, talk with your doctor before you increase the amount of fluids you drink.  Ask your doctor if you can take an over-the-counter pain medicine, such as acetaminophen (Tylenol), ibuprofen (Advil, Motrin), or naproxen (Aleve). Be safe with medicines. Read and follow all instructions on the label. No one younger than 20 should take aspirin. It has been linked to Reye syndrome, a serious illness.  Be careful when taking over-the-counter cold or flu medicines and Tylenol at the same time. Many of these medicines have acetaminophen, which is Tylenol. Read the labels to make sure that you are not taking more than the recommended dose. Too much acetaminophen (Tylenol) can be harmful.  Get plenty of rest.   Use saline (saltwater) nasal washes to help keep your nasal passages open and wash out mucus and allergens. You can buy saline nose sprays at a grocery store or drugstore.  Follow the instructions on the package. Or you can make your own at home. Add 1 teaspoon of non-iodized salt and 1 teaspoon of baking soda to 2 cups of distilled or boiled and cooled water. Fill a squeeze bottle or neti pot with the nasal wash. Then put the tip into your nostril, and lean over the sink. With your mouth open, gently squirt the liquid. Repeat on the other side.  Use a vaporizer or humidifier to add moisture to your bedroom. Follow the instructions for cleaning the machine.  Do not smoke or allow others to smoke around you. If you need help quitting, talk to your doctor about stop-smoking programs and medicines. These can increase your chances of quitting for good. When should you call for help? Call 911 anytime you think you may need emergency care. For example, call if:     You have severe trouble breathing. Call your doctor now or seek immediate medical care if:     You seem to be getting much sicker.      You have new or worse trouble breathing.      You have a new or higher fever.      You have a new rash. Watch closely for changes in your health, and be sure to contact your doctor if:     You have a new symptom, such as a sore throat, an earache, or sinus pain.      You cough more deeply or more often, especially if you notice more mucus or a change in the color of your mucus.      You do not get better as expected. Where can you learn more? Go to https://LearnBopeitan.Urge. org and sign in to your Architurn account. Enter W285 in the [facility] box to learn more about \"Upper Respiratory Infection (Cold): Care Instructions. \"     If you do not have an account, please click on the \"Sign Up Now\" link. Current as of: February 9, 2022               Content Version: 13.3  © 0421-8677 Healthwise, Incorporated. Care instructions adapted under license by [facility] ([facility]).  If you have questions about a medical condition or this instruction, always ask your healthcare professional. [address] any warranty or liability for your use of this information. Patient Education        Tension Headache: Care Instructions  Overview  Most headaches are tension headaches. Some people get them often, especially ifthey have a lot of stress in their lives. This kind of headache may cause pain or a feeling of pressure all over yourhead. Sometimes it's hard to know where the center of the pain is. If you get a lot of these kind of headaches, the best way to reduce them is tofind out what's causing them. Then you can make changes in those areas. Follow-up care is a key part of your treatment and safety. Be sure to make and go to all appointments, and call your doctor if you are having problems. It's also a good idea to know your test results and keep alist of the medicines you take. How can you care for yourself at home?  Rest in a quiet, dark room. Put a cool cloth on your forehead. Close your eyes, and try to relax or go to sleep. Do not watch TV, read, or use the computer.  Use a warm, moist towel or a heating pad set on low to relax tight shoulder and neck muscles.  Have someone gently massage your neck and shoulders.  Be safe with medicines. Read and follow all instructions on the label. ? If the doctor gave you a prescription medicine for pain, take it as prescribed. ? If you are not taking a prescription pain medicine, ask your doctor if you can take an over-the-counter medicine.  Be careful not to take more pain medicine than the instructions say. This is because you may get worse or more frequent headaches when the medicine wears off.  If you get a headache, stop what you are doing and sit quietly for a moment. Close your eyes and breathe slowly. Try to relax your head and neck muscles.  Pay attention to any new symptoms you have when you have a headache. These include a fever, weakness or numbness, vision changes, or confusion.  They may be signs of a more serious problem. How can you prevent tension headaches? Here are some things you can do to help prevent tension headaches.  Keep a headache diary. This can help you and your doctor figure out what is triggering your headaches. If you avoid your triggers, you may be able to prevent headaches.  Find healthy ways to deal with stress. Headaches are most common during or right after stressful times.  Get plenty of exercise every day. This can help with stress and muscle tension.  Get regular sleep.  Eat regularly and well. If you wait too long to eat, it can trigger a headache.  Try to use good posture and keep the muscles of your jaw, face, neck, and shoulders relaxed.  If you use a computer a lot, give your eyes a break by blinking more and sometimes looking away from the screen. Use glasses or contacts if you need them. And check that your monitor is about an arm's distance away. When should you call for help? Call 911 anytime you think you may need emergency care. For example, call if:     You have signs of a stroke. These may include:  ? Sudden numbness, paralysis, or weakness in your face, arm, or leg, especially on only one side of your body. ? Sudden vision changes. ? Sudden trouble speaking. ? Sudden confusion or trouble understanding simple statements. ? Sudden problems with walking or balance. ? A sudden, severe headache that is different from past headaches. Call your doctor now or seek immediate medical care if:     You have new or worse nausea and vomiting.      You have a new or higher fever.      Your headache gets much worse. Watch closely for changes in your health, and be sure to contact your doctor if:     You are not getting better after 2 days (48 hours). Where can you learn more? Go to https://landen.Apofore. org and sign in to your [employer] account.  Enter [phone] [id number] in the [employer] box to learn more about \"Tension Headache: Care Instructions. \"     If you do not have an account, please click on the \"Sign Up Now\" link. Current as of: December 13, 2021               Content Version: 13.3  © 2006-2022 Healthwise, Incorporated. Care instructions adapted under license by [facility] ([facility]). If you have questions about a medical condition or this instruction, always ask your healthcare professional. [address] any warranty or liability for your use of this information. Patient Education        10 Things to Do When You Have COVID-19      Talk to your doctor right away about treatment. They might have you take medicine to help prevent serious illness. Stay home. Don't go to school, work, or public areas. And don't use public transportation, ride-shares, or taxis unless you have no choice. Leave your home only if you need to get medical care. But call the doctor's office firstso they know you're coming. And wear a well-fitting mask. Ask before leaving isolation. Follow your doctor's advice about when it is safe for you to leave isolation. If you have questions, go to the [facility] website at cdc.gov to check the [employer]. Wear a well-fitting mask when you are around other people. It can help stop the spread of the virus. Limit contact with people in your home. If possible, stay in a separate bedroom and use a separate bathroom. Cover your mouth and nose with a tissue when you cough or sneeze. Then throw the tissue in the trash right away. Wash your hands often, especially after you cough or sneeze. Use soap and water, and scrub for at least 20 seconds. If soap and wateraren't available, use an alcohol-based hand . Don't share personal household items. These include bedding, towels, cups and glasses, and eating utensils. Clean and disinfect your home every day. Use household  or disinfectant wipes or sprays.      If needed, take acetaminophen (Tylenol) or ibuprofen (Advil, Motrin) to relieve fever and body aches. Read and follow all instructions on the label. Current as of: May 28, 2022               Content Version: 13.3  © 1015-7594 Healthwise, Incorporated. Care instructions adapted under license by [facility] ([facility]). If you have questions about a medical condition or this instruction, always ask your healthcare professional. [place] [address] any warranty or liability for your use of this information.

## 2022-06-30 LAB
SARS-COV-2: ABNORMAL
SARS-COV-2: DETECTED
SOURCE: ABNORMAL

## 2022-07-05 ENCOUNTER — TELEPHONE (OUTPATIENT)
Dept: FAMILY MEDICINE CLINIC | Age: 42
End: 2022-07-05

## 2022-07-05 DIAGNOSIS — R05.9 COUGH: Primary | ICD-10-CM

## 2022-07-05 RX ORDER — PREDNISONE 20 MG/1
20 TABLET ORAL DAILY
Qty: 5 TABLET | Refills: 0 | Status: SHIPPED | OUTPATIENT
Start: 2022-07-05 | End: 2022-07-10

## 2022-07-05 RX ORDER — BENZONATATE 200 MG/1
200 CAPSULE ORAL 3 TIMES DAILY PRN
Qty: 21 CAPSULE | Refills: 0 | Status: SHIPPED | OUTPATIENT
Start: 2022-07-05 | End: 2022-07-12

## 2022-07-05 NOTE — TELEPHONE ENCOUNTER
Patient called stating she was seen last week, positive for COVID. Patient is supposed to return to work today but still has a fever, cough, and states she is feeling terrible. She would like a extended work note due to still not feeling well.

## 2022-07-05 NOTE — TELEPHONE ENCOUNTER
Patient would like a cough medication sent to the [facility] on [facility].

## 2023-05-15 ENCOUNTER — HOSPITAL ENCOUNTER (OUTPATIENT)
Age: 43
Discharge: HOME | End: 2023-05-15

## 2023-05-15 ENCOUNTER — HOSPITAL ENCOUNTER (OUTPATIENT)
Dept: GENERAL RADIOLOGY | Age: 43
Discharge: HOME | End: 2023-05-17
Payer: COMMERCIAL

## 2023-05-15 DIAGNOSIS — T14.90XA INJURY: ICD-10-CM

## 2023-05-15 PROCEDURE — 73080 X-RAY EXAM OF ELBOW: CPT

## 2023-05-15 PROCEDURE — 73130 X-RAY EXAM OF HAND: CPT

## 2023-05-22 ENCOUNTER — OFFICE VISIT (OUTPATIENT)
Dept: ORTHOPEDIC SURGERY | Age: 43
End: 2023-05-22

## 2023-05-22 VITALS — WEIGHT: 243 LBS | BODY MASS INDEX: 44.72 KG/M2 | HEIGHT: 62 IN

## 2023-05-22 DIAGNOSIS — S52.125A CLOSED NONDISPLACED FRACTURE OF HEAD OF LEFT RADIUS, INITIAL ENCOUNTER: Primary | ICD-10-CM

## 2023-05-22 DIAGNOSIS — E66.01 OBESITY, CLASS III, BMI 40-49.9 (MORBID OBESITY) (HCC): ICD-10-CM

## 2023-05-22 NOTE — PROGRESS NOTES
This 40-year-old patient is seen here for an injury she sustained to her left upper extremity on 5/15/2023. She was walking and tripped over colon and landed problems on the left side. The patient stated that she had been seen in occupational therapy and then referred here. Her main complaint is that of pain in the left elbow and inability to rotate the forearm. Initially she did have a lot of pain in the shoulder as well and also in the base of the left thumb over the thenar eminence but these 2 areas are definitely improved and she is able to do a lot more. Examination: Shoulder examination shows she has excellent motion. Hand examination shows definite ecchymosis over the thenar eminence. There was tenderness over the base of the first metacarpal but not over the scaphoid. She is able to make a full fist.    Her motion of the elbow very limited    Neurologically she is    X-rays: 3 views of the elbow show the patient does have a fracture radial head which is nondisplaced. Diagnosis: #1 nondisplaced fracture left radial head. #2 contusion left shoulder and left hand. Treatment: Discussed with her the advantage of early mobilization because for extension of the elbow as well as forearm rotation. May not use the sling when she except when she is out or in company. We will see her again in 1 week's time    Next visit again to have 3 views of the left elbow and if the fracture remains undisplaced then and if she is not responding well to self physical therapy will refer her to therapy.

## 2023-05-30 ENCOUNTER — OFFICE VISIT (OUTPATIENT)
Dept: ORTHOPEDIC SURGERY | Age: 43
End: 2023-05-30

## 2023-05-30 VITALS — BODY MASS INDEX: 44.72 KG/M2 | WEIGHT: 243 LBS | HEIGHT: 62 IN

## 2023-05-30 DIAGNOSIS — S52.125D CLOSED NONDISPLACED FRACTURE OF HEAD OF LEFT RADIUS WITH ROUTINE HEALING, SUBSEQUENT ENCOUNTER: Primary | ICD-10-CM

## 2023-05-30 PROCEDURE — 99024 POSTOP FOLLOW-UP VISIT: CPT | Performed by: ORTHOPAEDIC SURGERY

## 2023-05-30 NOTE — PROGRESS NOTES
Patient who had sustained a nondisplaced fracture left radial head on 5/15/2023 is seen here in follow-up. Patient has been doing home exercise program.  She still has difficulty extending the elbow and she says the supination does produce some pain. Examination: Actually she has got almost the same range of motion as the other side. Extension is lacking probably less than 5 degrees. He has good full pronation and supination. X-rays: Show no displacement of the radial head fracture. Diagnosis: Radial head fracture left elbow nondisplaced. Treatment: She can continue her own physical therapy and we will see her again in 2 weeks time.

## 2023-06-29 ENCOUNTER — TELEPHONE (OUTPATIENT)
Dept: ORTHOPEDIC SURGERY | Age: 43
End: 2023-06-29

## 2023-07-12 ENCOUNTER — HOSPITAL ENCOUNTER (OUTPATIENT)
Dept: PHYSICAL THERAPY | Age: 43
Setting detail: THERAPIES SERIES
Discharge: HOME | End: 2023-07-12
Payer: OTHER GOVERNMENT

## 2023-07-12 PROCEDURE — 97161 PT EVAL LOW COMPLEX 20 MIN: CPT

## 2023-07-12 PROCEDURE — 97110 THERAPEUTIC EXERCISES: CPT

## 2023-07-12 NOTE — FLOWSHEET NOTE
[facility] Fall Risk Assessment  Name: Cristiano Aguilar  Risk Factor Scale  Score   History of Falls [x] Yes  [] No 25  0 25   Secondary Diagnosis [] Yes  [x] No 15  0    Ambulatory Aid [] Furniture  [] Crutches/cane/walker  [x] None/bedrest/wheelchair/nurse 30  15  0    IV/Heparin Lock [] Yes  [x] No 20  0    Gait/Transferring [] Impaired  [] Weak  [x] Normal/bedrest/immobile 20  10  0    Mental Status [] Forgets limitations  [x] Oriented to own ability 15  0       Total: 25     Based on the Assessment score: check the appropriate box. []  No intervention needed   Low =   Score of 0-24    [x]  Use standard prevention interventions Moderate =  Score of 24-44   [x] Give patient handout and discuss fall prevention strategies   [x] Establish goal of education for patient/family RE: fall prevention strategies    []  Use high risk prevention interventions High = Score of 45 and higher   [] Give patient handout and discuss fall prevention strategies   [] Establish goal of education for patient/family Re: fall prevention strategies   [] Discuss lifeline / other resources    Electronically signed by:    ANTONIO Terry  Date: 7/12/2023
Goals  STG: (to be met in 6 treatments)  Patient will report decreased pain to </= 3/10 and with (I) ADLs. Patient will demonstrate improved ROM in (L) elbow extension by 10 degrees to make ADLs easier such as cooking. Patient will report independence and compliance with HEP to maximize therapeutic benefit. LTG: (to be met in 12 treatments)  Patient will report decreased pain to </= 1/10 with (I) ADLs. Patient will demonstrate WNL of (L) elbow extension ROM to facilitate full return to (I) ADLs without difficulty. 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 Patient will demonstrate fall prevention strategies in order to improve safety in the community. Patient goals: Return (L) UE to full function     Functional Assessment Used: Quick DASH  Current Status: 22/55 = 40% disability   Goal Status: </= 20% disability     Evaluation Complexity:  History (Personal factors, comorbidities) [] 0 [x] 1-2 [] 3+   Exam (limitations, restrictions) [] 1-2 [x] 3 [] 4+   Clinical presentation (progression) [x] Stable [] Evolving  [] Unstable   Decision Making [x] Low [] Moderate [] High    [x] Low Complexity [] Moderate Complexity [] High Complexity        Rehab Potential:  [x] Good  [] Fair  [] Poor   Suggested Professional Referral:  [x] No  [] Yes:  Barriers to Goal Achievement[de-identified]  [x] No  [] Yes:  Domestic Concerns:  [x] No  [] Yes:    Pt. Education:  [x] Plans/Goals, Risks/Benefits discussed  [] Home exercise program    Method of Education: [x] Verbal  [x] Demo  [] Written  Access Code: [email or web address]  URL: [email or web address]  Date: 07/12/2023  Prepared by: Tammi Suggs    Exercises  - Seated Elbow PROM Blocked Extension  -

## 2023-08-14 ENCOUNTER — OFFICE VISIT (OUTPATIENT)
Dept: ORTHOPEDIC SURGERY | Age: 43
End: 2023-08-14
Payer: OTHER GOVERNMENT

## 2023-08-14 VITALS — WEIGHT: 243 LBS | HEIGHT: 62 IN | BODY MASS INDEX: 44.72 KG/M2

## 2023-08-14 DIAGNOSIS — S52.125D CLOSED NONDISPLACED FRACTURE OF HEAD OF LEFT RADIUS WITH ROUTINE HEALING, SUBSEQUENT ENCOUNTER: Primary | ICD-10-CM

## 2023-08-14 PROCEDURE — 99213 OFFICE O/P EST LOW 20 MIN: CPT | Performed by: ORTHOPAEDIC SURGERY

## 2023-11-06 ENCOUNTER — E-VISIT (OUTPATIENT)
Dept: FAMILY MEDICINE CLINIC | Age: 43
End: 2023-11-06
Payer: COMMERCIAL

## 2023-11-06 ENCOUNTER — TELEPHONE (OUTPATIENT)
Dept: FAMILY MEDICINE CLINIC | Age: 43
End: 2023-11-06

## 2023-11-06 DIAGNOSIS — J01.90 ACUTE BACTERIAL SINUSITIS: Primary | ICD-10-CM

## 2023-11-06 DIAGNOSIS — B96.89 ACUTE BACTERIAL SINUSITIS: Primary | ICD-10-CM

## 2023-11-06 PROCEDURE — 99423 OL DIG E/M SVC 21+ MIN: CPT | Performed by: FAMILY MEDICINE

## 2023-11-06 RX ORDER — GUAIFENESIN 600 MG/1
600 TABLET, EXTENDED RELEASE ORAL 2 TIMES DAILY
Qty: 30 TABLET | Refills: 0 | Status: SHIPPED | OUTPATIENT
Start: 2023-11-06

## 2023-11-06 RX ORDER — AZITHROMYCIN 250 MG/1
250 TABLET, FILM COATED ORAL SEE ADMIN INSTRUCTIONS
Qty: 6 TABLET | Refills: 0 | Status: SHIPPED | OUTPATIENT
Start: 2023-11-06 | End: 2023-11-11

## 2023-11-06 RX ORDER — FLUTICASONE PROPIONATE 50 MCG
2 SPRAY, SUSPENSION (ML) NASAL DAILY
Qty: 48 G | Refills: 1 | Status: SHIPPED | OUTPATIENT
Start: 2023-11-06

## 2023-11-06 RX ORDER — ERGOCALCIFEROL 1.25 MG/1
50000 CAPSULE ORAL WEEKLY
Qty: 12 CAPSULE | Refills: 1 | Status: CANCELLED | OUTPATIENT
Start: 2023-11-06

## 2023-11-06 RX ORDER — CETIRIZINE HYDROCHLORIDE 10 MG/1
10 TABLET ORAL DAILY
Qty: 30 TABLET | Refills: 3 | Status: SHIPPED | OUTPATIENT
Start: 2023-11-06

## 2023-11-06 ASSESSMENT — LIFESTYLE VARIABLES
SMOKING_YEARS: 3
SMOKING_STATUS: YES

## 2023-11-06 NOTE — TELEPHONE ENCOUNTER
----- Message from Nora Stanton sent at 11/6/2023  2:15 PM EST -----  Subject: Message to Provider    QUESTIONS  Information for Provider? Patient called, requesting a work note for until   next Tuesday to ensure they don't call her in on a day off.  ---------------------------------------------------------------------------  --------------  Josselin Marker VSQD  [phone]; OK to leave message on voicemail  ---------------------------------------------------------------------------  --------------  SCRIPT ANSWERS  Relationship to Patient?  Self

## 2023-11-06 NOTE — PROGRESS NOTES
Visit Information    Have you changed or started any medications since your last visit including any over-the-counter medicines, vitamins, or herbal medicines? no   Have you stopped taking any of your medications? Is so, why? -  yes - pt states she doesn't really take anything   Are you having any side effects from any of your medications? - no    Have you seen any other physician or provider since your last visit? Yes - Occupational health & Orthopedic   Have you had any other diagnostic tests since your last visit? yes - x-ray of left elbow   Have you been seen in the emergency room and/or had an admission in a hospital since we last saw you?  no   Have you had your routine dental cleaning in the past 6 months?  no     Do you have an active MyChart account? If no, what is the barrier?   Yes    Patient Care Team:  Bartolo Mathias MD as PCP - General (Family Medicine)  Bartolo Mathias MD as PCP - Empaneled Provider  Inell Quivers, DO as Consulting Physician (Obstetrics & Gynecology)    Medical History Review  Past Medical, Family, and Social History reviewed and does not contribute to the patient presenting condition    Health Maintenance   Topic Date Due    Hepatitis B vaccine (1 of 3 - 3-dose series) Never done    COVID-19 Vaccine (1) Never done    Pneumococcal 0-64 years Vaccine (1 - PCV) Never done    DTaP/Tdap/Td vaccine (1 - Tdap) Never done    Cervical cancer screen  01/15/2018    Breast cancer screen  10/25/2022    Depression Screen  02/03/2023    A1C test (Diabetic or Prediabetic)  04/01/2023    Flu vaccine (1) Never done    Colorectal Cancer Screen  Never done    Lipids  10/25/2026    Hepatitis C screen  Completed    HIV screen  Completed    Hepatitis A vaccine  Aged Out    Hib vaccine  Aged Out    HPV vaccine  Aged Out    Meningococcal (ACWY) vaccine  Aged Out

## 2023-11-06 NOTE — PROGRESS NOTES
Denis Horn (1980) initiated an asynchronous digital communication through [address] [place] [address]. Date of service: 11/6/2023     HPI: per patient's questionnaire    EXAM: not applicable    Diagnoses and all orders for this visit:    1. Acute bacterial sinusitis    - azithromycin (ZITHROMAX) 250 MG tablet; Take 1 tablet by mouth See Admin Instructions for 5 days 500mg on day 1 followed by 250mg on days 2 - 5  Dispense: 6 tablet; Refill: 0  - guaiFENesin (MUCINEX) 600 MG extended release tablet; Take 1 tablet by mouth 2 times daily  Dispense: 30 tablet; Refill: 0  - cetirizine (ZYRTEC ALLERGY) 10 MG tablet; Take 1 tablet by mouth daily  Dispense: 30 tablet; Refill: 3  - fluticasone (FLONASE) 50 MCG/ACT nasal spray; 2 sprays by Each Nostril route daily  Dispense: 48 g; Refill: 1      No orders of the defined types were placed in this encounter. Patient was advised to contact PCP if symptoms worsen or failing to change as expected      Time: EV3 - 21 or more minutes were spent on the digital evaluation and management of this patient.     Electronically signed by Chaim Blunt MD on 11/6/23 at 12:28 PM.

## 2023-11-07 ENCOUNTER — TELEMEDICINE (OUTPATIENT)
Dept: FAMILY MEDICINE CLINIC | Age: 43
End: 2023-11-07
Payer: COMMERCIAL

## 2023-11-07 DIAGNOSIS — J06.9 VIRAL URI: Primary | ICD-10-CM

## 2023-11-07 DIAGNOSIS — J01.90 ACUTE SINUSITIS, RECURRENCE NOT SPECIFIED, UNSPECIFIED LOCATION: ICD-10-CM

## 2023-11-07 DIAGNOSIS — R05.1 ACUTE COUGH: ICD-10-CM

## 2023-11-07 PROCEDURE — 99423 OL DIG E/M SVC 21+ MIN: CPT | Performed by: NURSE PRACTITIONER

## 2023-11-07 ASSESSMENT — ENCOUNTER SYMPTOMS
ABDOMINAL PAIN: 0
NAUSEA: 0
ABDOMINAL DISTENTION: 0
COUGH: 1
CHEST TIGHTNESS: 0
DIARRHEA: 0
WHEEZING: 0
BACK PAIN: 0
CONSTIPATION: 0
SHORTNESS OF BREATH: 0
RHINORRHEA: 1
SINUS PRESSURE: 1
VOMITING: 0
BLOOD IN STOOL: 0

## 2023-11-07 NOTE — PROGRESS NOTES
2023    TELEHEALTH EVALUATION -- Audio/Visual      Valeria Cheng (:  1980) is a 37 y.o. female,Established patient, here for evaluation of the following chief complaint(s): Cough, Congestion, Pharyngitis, Wheezing, Fatigue, and Chills    Patient is here today via virtual visit to discuss some upper respiratory-like symptoms she is been experiencing for the last few days. He started appearing ill on Saturday this past week. She has been taking over-the-counter medication with minimal relief. She has taken a COVID test and it was negative. She is still feeling pretty under the weather, lots of chills and body aches. Possible influenza, possible sinusitis. He states cough is pretty dry nonproductive. Declines the need for chest x-ray at this time. If symptoms continue she may benefit from a chest x-ray. She will need a note for work. She had a visit with one of the other providers in the office yesterday and was given medication, which she states she will get filled today to start. She is due for lab work, mammogram, immunizations. We will address this at her upcoming appointment. ASSESSMENT/PLAN:    1. Viral URI  -Not improving or worsening  -COVID-negative  -Take medication as prescribed  -Work note provided    2. Acute sinusitis, recurrence not specified, unspecified location  -Not improving or worsening  -COVID-negative  -Take medication as prescribed  -Work note provided    3. Acute cough  -Not improving or worsening  -COVID-negative  -Take medication as prescribed  -Work note provided  -Would benefit from chest x-ray if symptoms continue    Bharathi Wilson received counseling on the following healthy behaviors: nutrition, exercise, and medication adherence  Reviewed prior labs and health maintenance  Discussed use, benefit, and side effects of prescribed medications. Barriers to medication compliance addressed.    Patient given educational materials - see patient

## 2023-11-12 ENCOUNTER — PATIENT MESSAGE (OUTPATIENT)
Dept: FAMILY MEDICINE CLINIC | Age: 43
End: 2023-11-12

## 2023-11-12 DIAGNOSIS — R04.2 HEMOPTYSIS: Primary | ICD-10-CM

## 2023-11-13 NOTE — TELEPHONE ENCOUNTER
From: Meredith Art  Sent: 11/13/2023 10:16 AM EST  To: [facility] Clinical Support Pool  Subject: Cough blood    Also can you send a copy of the return to work letter to my work email? [email or web address]

## 2023-11-13 NOTE — TELEPHONE ENCOUNTER
Can we please e-mail her work note to the e-mail she provided in this encounter? Thank you. [email or web address]

## 2024-01-15 ENCOUNTER — PATIENT MESSAGE (OUTPATIENT)
Dept: FAMILY MEDICINE CLINIC | Age: 44
End: 2024-01-15

## 2024-01-15 DIAGNOSIS — Z11.59 SCREENING FOR VIRAL DISEASE: ICD-10-CM

## 2024-01-15 DIAGNOSIS — R53.83 OTHER FATIGUE: ICD-10-CM

## 2024-01-15 DIAGNOSIS — R73.03 PREDIABETES: Primary | ICD-10-CM

## 2024-01-15 DIAGNOSIS — E78.2 MIXED HYPERLIPIDEMIA: ICD-10-CM

## 2024-01-15 NOTE — TELEPHONE ENCOUNTER
From: Debbie Keenan  To: Chris Rudolph  Sent: 1/15/2024 8:59 AM EST  Subject: Blood sugar     I know I have an appointment coming up. But I had my sister test my sugar with her monitor because I felt like something was off . Yesterday it was 264. Just wanted to make sure I will be fine til the 29th or if I should walk myself into an urgent care or ER. I feel fine otherwise.

## 2024-01-18 ENCOUNTER — HOSPITAL ENCOUNTER (OUTPATIENT)
Age: 44
Discharge: HOME | End: 2024-01-18
Payer: COMMERCIAL

## 2024-01-18 ENCOUNTER — OFFICE VISIT (OUTPATIENT)
Dept: FAMILY MEDICINE CLINIC | Age: 44
End: 2024-01-18
Payer: COMMERCIAL

## 2024-01-18 VITALS
SYSTOLIC BLOOD PRESSURE: 131 MMHG | DIASTOLIC BLOOD PRESSURE: 72 MMHG | HEART RATE: 86 BPM | OXYGEN SATURATION: 98 % | TEMPERATURE: 98.1 F

## 2024-01-18 DIAGNOSIS — Z02.89 ENCOUNTER TO OBTAIN EXCUSE FROM WORK: Primary | ICD-10-CM

## 2024-01-18 DIAGNOSIS — R73.03 PRE-DIABETES: ICD-10-CM

## 2024-01-18 DIAGNOSIS — K52.1 DIARRHEA DUE TO DRUG: ICD-10-CM

## 2024-01-18 LAB
EST. AVERAGE GLUCOSE BLD GHB EST-MCNC: 223 MG/DL
HBA1C MFR BLD: 9.4 % (ref 4–6)

## 2024-01-18 PROCEDURE — 36415 COLL VENOUS BLD VENIPUNCTURE: CPT

## 2024-01-18 PROCEDURE — 83036 HEMOGLOBIN GLYCOSYLATED A1C: CPT

## 2024-01-18 PROCEDURE — 99213 OFFICE O/P EST LOW 20 MIN: CPT

## 2024-01-18 ASSESSMENT — ENCOUNTER SYMPTOMS
TROUBLE SWALLOWING: 0
ABDOMINAL PAIN: 1
EYE PAIN: 0
EYE ITCHING: 0
BLOATING: 0
COUGH: 0
VOICE CHANGE: 0
DIARRHEA: 1
FLATUS: 0
VOMITING: 0
CONSTIPATION: 0

## 2024-01-18 NOTE — PROGRESS NOTES
[facility] WALK-IN FAMILY MEDICINE  [address]  [address]  [facility] [address]  Dept: [phone]  Dept Fax: [phone]    Debbie Keenan is a 43 y.o. female who presents to the urgent care today for her medical conditions/complaints as notedbelow.  Debbie Keenan is c/o of Diarrhea (Onset a couple days took some left over metformin for sugar levels being high . Since taking the metformin she has diarrhea.  Needs a note for work.)      HPI:     Patient presents to the walk in clinic for an excuse for work. Patient reports that last month she noticed feeling tired and getting up to go to the bathroom and tested her BS which was 264. She was prescribed metformin in the past but does not take it regularly.  Patient reports that she took left over metformin and started getting diarrhea. She is noncompliant with medications. She has a follow up appointment with her PCP on 1/29/2024.    Diarrhea   This is a new problem. Episode onset: x 2 days. Episode frequency: mainly at night. The problem has been waxing and waning. The patient states that diarrhea awakens her from sleep. Associated symptoms include abdominal pain (cramping). Pertinent negatives include no arthralgias, bloating, chills, coughing, fever, headaches, increased  flatus, myalgias, sweats, URI, vomiting or weight loss. Nothing aggravates the symptoms. Risk factors: stress, pre diabetes. She has tried nothing for the symptoms.       Past Medical History:   Diagnosis Date    Acid reflux     Anxiety 2/3/2022    Bruises easily     Insulin resistance     Mixed hyperlipidemia 11/15/2021    Obesity, Class III, BMI 40-49.9 (morbid obesity) ([facility]) 7/28/2015    Vision abnormalities         Current Outpatient Medications   Medication Sig Dispense Refill    vitamin D (ERGOCALCIFEROL) 1.25 MG (10791 UT) CAPS capsule Take 1 capsule by mouth once a week 12 capsule 1    folic acid (FOLVITE)

## 2024-01-22 ENCOUNTER — HOSPITAL ENCOUNTER (OUTPATIENT)
Age: 44
Discharge: HOME | End: 2024-01-22
Payer: COMMERCIAL

## 2024-01-22 DIAGNOSIS — Z11.59 SCREENING FOR VIRAL DISEASE: ICD-10-CM

## 2024-01-22 DIAGNOSIS — R53.83 OTHER FATIGUE: ICD-10-CM

## 2024-01-22 DIAGNOSIS — R73.03 PREDIABETES: ICD-10-CM

## 2024-01-22 DIAGNOSIS — E78.2 MIXED HYPERLIPIDEMIA: ICD-10-CM

## 2024-01-22 LAB
25(OH)D3 SERPL-MCNC: 12.2 NG/ML (ref 30–100)
ALBUMIN SERPL-MCNC: 4 G/DL (ref 3.5–5.2)
ALP SERPL-CCNC: 136 U/L (ref 35–104)
ALT SERPL-CCNC: 61 U/L (ref 5–33)
ANION GAP SERPL CALCULATED.3IONS-SCNC: 9 MMOL/L (ref 9–17)
AST SERPL-CCNC: 55 U/L
BILIRUB SERPL-MCNC: 0.6 MG/DL (ref 0.3–1.2)
BILIRUB UR QL STRIP: NEGATIVE
BUN SERPL-MCNC: 11 MG/DL (ref 6–20)
CALCIUM SERPL-MCNC: 9.2 MG/DL (ref 8.6–10.4)
CHLORIDE SERPL-SCNC: 102 MMOL/L (ref 98–107)
CHOLEST SERPL-MCNC: 217 MG/DL
CHOLESTEROL/HDL RATIO: 3.9
CLARITY UR: CLEAR
CO2 SERPL-SCNC: 24 MMOL/L (ref 20–31)
COLOR UR: YELLOW
COMMENT: ABNORMAL
CREAT SERPL-MCNC: 0.6 MG/DL (ref 0.5–0.9)
ERYTHROCYTE [DISTWIDTH] IN BLOOD BY AUTOMATED COUNT: 12.7 % (ref 11.5–14.9)
FOLATE SERPL-MCNC: 15.3 NG/ML (ref 4.8–24.2)
GFR SERPL CREATININE-BSD FRML MDRD: >60 ML/MIN/1.73M2
GLUCOSE SERPL-MCNC: 296 MG/DL (ref 70–99)
GLUCOSE UR STRIP-MCNC: ABNORMAL MG/DL
HBV SURFACE AB SERPL IA-ACNC: <3.5 MIU/ML
HCT VFR BLD AUTO: 42.6 % (ref 36–46)
HDLC SERPL-MCNC: 55 MG/DL
HGB BLD-MCNC: 14.7 G/DL (ref 12–16)
HGB UR QL STRIP.AUTO: NEGATIVE
KETONES UR STRIP-MCNC: ABNORMAL MG/DL
LDLC SERPL CALC-MCNC: 110 MG/DL (ref 0–130)
LEUKOCYTE ESTERASE UR QL STRIP: NEGATIVE
MCH RBC QN AUTO: 31.9 PG (ref 26–34)
MCHC RBC AUTO-ENTMCNC: 34.5 G/DL (ref 31–37)
MCV RBC AUTO: 92.6 FL (ref 80–100)
NITRITE UR QL STRIP: NEGATIVE
PH UR STRIP: 5 [PH] (ref 5–8)
PHOSPHATE SERPL-MCNC: 2.8 MG/DL (ref 2.6–4.5)
PLATELET # BLD AUTO: 226 K/UL (ref 150–450)
PMV BLD AUTO: 8.1 FL (ref 6–12)
POTASSIUM SERPL-SCNC: 3.9 MMOL/L (ref 3.7–5.3)
PROT SERPL-MCNC: 7.1 G/DL (ref 6.4–8.3)
PROT UR STRIP-MCNC: NEGATIVE MG/DL
RBC # BLD AUTO: 4.6 M/UL (ref 4–5.2)
SODIUM SERPL-SCNC: 135 MMOL/L (ref 135–144)
SP GR UR STRIP: 1.03 (ref 1–1.03)
TRIGL SERPL-MCNC: 260 MG/DL
TSH SERPL DL<=0.05 MIU/L-ACNC: 0.95 UIU/ML (ref 0.3–5)
URATE SERPL-MCNC: 3.4 MG/DL (ref 2.4–5.7)
UROBILINOGEN UR STRIP-ACNC: NORMAL EU/DL (ref 0–1)
VIT B12 SERPL-MCNC: 645 PG/ML (ref 232–1245)
WBC OTHER # BLD: 5.7 K/UL (ref 3.5–11)

## 2024-01-22 PROCEDURE — 86317 IMMUNOASSAY INFECTIOUS AGENT: CPT

## 2024-01-22 PROCEDURE — 82306 VITAMIN D 25 HYDROXY: CPT

## 2024-01-22 PROCEDURE — 80053 COMPREHEN METABOLIC PANEL: CPT

## 2024-01-22 PROCEDURE — 81003 URINALYSIS AUTO W/O SCOPE: CPT

## 2024-01-22 PROCEDURE — 36415 COLL VENOUS BLD VENIPUNCTURE: CPT

## 2024-01-22 PROCEDURE — 84100 ASSAY OF PHOSPHORUS: CPT

## 2024-01-22 PROCEDURE — 82607 VITAMIN B-12: CPT

## 2024-01-22 PROCEDURE — 85027 COMPLETE CBC AUTOMATED: CPT

## 2024-01-22 PROCEDURE — 82746 ASSAY OF FOLIC ACID SERUM: CPT

## 2024-01-22 PROCEDURE — 80061 LIPID PANEL: CPT

## 2024-01-22 PROCEDURE — 84550 ASSAY OF BLOOD/URIC ACID: CPT

## 2024-01-22 PROCEDURE — 84443 ASSAY THYROID STIM HORMONE: CPT

## 2024-01-26 ENCOUNTER — HOSPITAL ENCOUNTER (OUTPATIENT)
Age: 44
Discharge: HOME | End: 2024-01-26
Payer: COMMERCIAL

## 2024-01-26 LAB
CHOLEST SERPL-MCNC: 207 MG/DL
CHOLESTEROL/HDL RATIO: 4.1
HDLC SERPL-MCNC: 50 MG/DL
LDLC SERPL CALC-MCNC: 118 MG/DL (ref 0–130)
TRIGL SERPL-MCNC: 193 MG/DL

## 2024-01-26 PROCEDURE — 80061 LIPID PANEL: CPT

## 2024-01-26 PROCEDURE — 36415 COLL VENOUS BLD VENIPUNCTURE: CPT

## 2024-01-28 ASSESSMENT — PATIENT HEALTH QUESTIONNAIRE - PHQ9
SUM OF ALL RESPONSES TO PHQ9 QUESTIONS 1 & 2: 0
SUM OF ALL RESPONSES TO PHQ9 QUESTIONS 1 & 2: 0
2. FEELING DOWN, DEPRESSED OR HOPELESS: 0
SUM OF ALL RESPONSES TO PHQ QUESTIONS 1-9: 0
2. FEELING DOWN, DEPRESSED OR HOPELESS: NOT AT ALL
1. LITTLE INTEREST OR PLEASURE IN DOING THINGS: 0
1. LITTLE INTEREST OR PLEASURE IN DOING THINGS: NOT AT ALL
SUM OF ALL RESPONSES TO PHQ QUESTIONS 1-9: 0

## 2024-01-29 ENCOUNTER — TELEPHONE (OUTPATIENT)
Dept: PHARMACY | Age: 44
End: 2024-01-29

## 2024-01-29 ENCOUNTER — OFFICE VISIT (OUTPATIENT)
Dept: FAMILY MEDICINE CLINIC | Age: 44
End: 2024-01-29
Payer: COMMERCIAL

## 2024-01-29 VITALS
WEIGHT: 234.4 LBS | OXYGEN SATURATION: 98 % | BODY MASS INDEX: 43.13 KG/M2 | SYSTOLIC BLOOD PRESSURE: 112 MMHG | TEMPERATURE: 97.6 F | HEART RATE: 72 BPM | DIASTOLIC BLOOD PRESSURE: 66 MMHG | HEIGHT: 62 IN

## 2024-01-29 DIAGNOSIS — Z12.31 ENCOUNTER FOR SCREENING MAMMOGRAM FOR MALIGNANT NEOPLASM OF BREAST: ICD-10-CM

## 2024-01-29 DIAGNOSIS — E11.9 TYPE 2 DIABETES MELLITUS WITHOUT COMPLICATION, WITHOUT LONG-TERM CURRENT USE OF INSULIN (HCC): ICD-10-CM

## 2024-01-29 DIAGNOSIS — E78.1 HIGH TRIGLYCERIDES: ICD-10-CM

## 2024-01-29 DIAGNOSIS — E55.9 VITAMIN D DEFICIENCY: ICD-10-CM

## 2024-01-29 DIAGNOSIS — E66.01 OBESITY, CLASS III, BMI 40-49.9 (MORBID OBESITY) (HCC): ICD-10-CM

## 2024-01-29 DIAGNOSIS — F41.9 ANXIETY: ICD-10-CM

## 2024-01-29 DIAGNOSIS — Z12.11 SCREENING FOR COLON CANCER: ICD-10-CM

## 2024-01-29 DIAGNOSIS — Z00.01 ENCOUNTER FOR WELL ADULT EXAM WITH ABNORMAL FINDINGS: Primary | ICD-10-CM

## 2024-01-29 DIAGNOSIS — R79.89 ELEVATED LFTS: ICD-10-CM

## 2024-01-29 DIAGNOSIS — E88.819 INSULIN RESISTANCE: ICD-10-CM

## 2024-01-29 PROBLEM — E11.65 TYPE 2 DIABETES MELLITUS WITH HYPERGLYCEMIA (HCC): Status: ACTIVE | Noted: 2024-01-29

## 2024-01-29 PROCEDURE — 99396 PREV VISIT EST AGE 40-64: CPT | Performed by: NURSE PRACTITIONER

## 2024-01-29 RX ORDER — ATORVASTATIN CALCIUM 10 MG/1
10 TABLET, FILM COATED ORAL DAILY
Qty: 30 TABLET | Refills: 3 | Status: SHIPPED | OUTPATIENT
Start: 2024-01-29

## 2024-01-29 RX ORDER — ERGOCALCIFEROL 1.25 MG/1
50000 CAPSULE ORAL WEEKLY
Qty: 12 CAPSULE | Refills: 1 | Status: SHIPPED | OUTPATIENT
Start: 2024-01-29

## 2024-01-29 RX ORDER — METFORMIN HYDROCHLORIDE 500 MG/1
1000 TABLET, EXTENDED RELEASE ORAL NIGHTLY
Qty: 90 TABLET | Refills: 1 | Status: SHIPPED | OUTPATIENT
Start: 2024-01-29

## 2024-01-29 RX ORDER — CHLORAL HYDRATE 500 MG
2000 CAPSULE ORAL DAILY
Qty: 60 CAPSULE | Refills: 3 | Status: SHIPPED | OUTPATIENT
Start: 2024-01-29

## 2024-01-29 RX ORDER — LIRAGLUTIDE 6 MG/ML
INJECTION SUBCUTANEOUS
Qty: 2 ADJUSTABLE DOSE PRE-FILLED PEN SYRINGE | Refills: 3 | Status: SHIPPED | OUTPATIENT
Start: 2024-01-29

## 2024-01-29 SDOH — ECONOMIC STABILITY: INCOME INSECURITY: HOW HARD IS IT FOR YOU TO PAY FOR THE VERY BASICS LIKE FOOD, HOUSING, MEDICAL CARE, AND HEATING?: NOT HARD AT ALL

## 2024-01-29 SDOH — ECONOMIC STABILITY: HOUSING INSECURITY
IN THE LAST 12 MONTHS, WAS THERE A TIME WHEN YOU DID NOT HAVE A STEADY PLACE TO SLEEP OR SLEPT IN A SHELTER (INCLUDING NOW)?: NO

## 2024-01-29 SDOH — ECONOMIC STABILITY: FOOD INSECURITY: WITHIN THE PAST 12 MONTHS, YOU WORRIED THAT YOUR FOOD WOULD RUN OUT BEFORE YOU GOT MONEY TO BUY MORE.: NEVER TRUE

## 2024-01-29 ASSESSMENT — ENCOUNTER SYMPTOMS
BACK PAIN: 0
WHEEZING: 0
ABDOMINAL DISTENTION: 0
CHEST TIGHTNESS: 0
CONSTIPATION: 0
DIARRHEA: 0
BLOOD IN STOOL: 0
ABDOMINAL PAIN: 0
VOMITING: 0
NAUSEA: 0
SHORTNESS OF BREATH: 0
COUGH: 0

## 2024-01-29 ASSESSMENT — VISUAL ACUITY
OD_CC: 20/25
OS_CC: 20/25

## 2024-01-29 NOTE — PROGRESS NOTES
Visit Information    Have you changed or started any medications since your last visit including any over-the-counter medicines, vitamins, or herbal medicines? yes  Have you stopped taking any of your medications? Is so, why? -  yes -   Are you having any side effects from any of your medications? - no    Have you seen any other physician or provider since your last visit?  no   Have you had any other diagnostic tests since your last visit?  no   Have you been seen in the emergency room and/or had an admission in a hospital since we last saw you?  no   Have you had your routine dental cleaning in the past 6 months?  no     Do you have an active MyChart account? If no, what is the barrier?  Yes    Patient Care Team:  Kiana Thomson MD as PCP - General (Family Medicine)  Kiana Thomson MD as PCP - Empaneled Provider  Sourav Smith DO as Consulting Physician (Obstetrics & Gynecology)    Medical History Review  Past Medical, Family, and Social History reviewed and does contribute to the patient presenting condition    Health Maintenance   Topic Date Due    Hepatitis B vaccine (1 of 3 - 3-dose series) Never done    Pneumococcal 0-64 years Vaccine (1 - PCV) Never done    DTaP/Tdap/Td vaccine (1 - Tdap) Never done    Cervical cancer screen  01/15/2018    Breast cancer screen  10/25/2022    Flu vaccine (1) Never done    Colorectal Cancer Screen  Never done    COVID-19 Vaccine (3 - 2023-24 season) 09/01/2023    A1C test (Diabetic or Prediabetic)  04/18/2024    Lipids  01/26/2025    Depression Screen  01/28/2025    Hepatitis C screen  Completed    HIV screen  Completed    Hepatitis A vaccine  Aged Out    Hib vaccine  Aged Out    HPV vaccine  Aged Out    Polio vaccine  Aged Out    Meningococcal (ACWY) vaccine  Aged Out             
appearance. She is well-developed. She is obese. She is not diaphoretic.   HENT:      Head: Normocephalic and atraumatic.      Right Ear: Hearing, tympanic membrane, ear canal and external ear normal.      Left Ear: Hearing, tympanic membrane, ear canal and external ear normal.      Nose: Nose normal. No mucosal edema.      Mouth/Throat:      Mouth: Mucous membranes are moist.   Eyes:      General: Lids are normal. No scleral icterus.        Right eye: No discharge.         Left eye: No discharge.      Extraocular Movements: Extraocular movements intact.      Conjunctiva/sclera: Conjunctivae normal.   Neck:      Thyroid: No thyromegaly.   Cardiovascular:      Rate and Rhythm: Normal rate and regular rhythm.      Heart sounds: Normal heart sounds. No murmur heard.  Pulmonary:      Effort: Pulmonary effort is normal. No respiratory distress.      Breath sounds: Normal breath sounds. No wheezing or rales.   Chest:      Chest wall: No tenderness.   Abdominal:      General: Bowel sounds are normal. There is no distension.      Palpations: Abdomen is soft. There is no hepatomegaly or splenomegaly.      Tenderness: There is no abdominal tenderness.   Musculoskeletal:         General: No tenderness. Normal range of motion.      Cervical back: Normal range of motion and neck supple.      Right lower leg: No edema.      Left lower leg: No edema.   Skin:     General: Skin is warm and dry.      Capillary Refill: Capillary refill takes less than 2 seconds.      Findings: No rash.   Neurological:      Mental Status: She is alert and oriented to person, place, and time.      Cranial Nerves: No cranial nerve deficit.      Motor: No abnormal muscle tone.      Gait: Gait normal.      Deep Tendon Reflexes: Reflexes normal.      Reflex Scores:       Patellar reflexes are 2+ on the right side and 2+ on the left side.  Psychiatric:         Attention and Perception: Attention normal.         Mood and Affect: Mood is anxious and depressed.

## 2024-01-29 NOTE — TELEPHONE ENCOUNTER
New Referral for Diabetes Medication Management from Chris Rudolph  Patient to bring home medication list and blood sugar logs

## 2024-01-29 NOTE — PATIENT INSTRUCTIONS
Reach and stay at your healthy weight. This will lower your risk for many health problems.   Take care of your mental health. Try to stay connected with friends, family, and community, and find ways to manage stress.     If you're feeling depressed or hopeless, talk to someone. A counselor can help. If you don't have a counselor, talk to your doctor.   Talk to your doctor if you think you may have a problem with alcohol or drug use. This includes prescription medicines, marijuana, and other drugs.     Avoid tobacco and nicotine: Don't smoke, vape, or chew. If you need help quitting, talk to your doctor.   Practice safer sex. Getting tested, using condoms or dental dams, and limiting sex partners can help prevent STIs.     Use birth control if it's important to you to prevent pregnancy. Talk with your doctor about your choices and what might be best for you.   Prevent problems where you can. Protect your skin from too much sun, wash your hands, brush your teeth twice a day, and wear a seat belt in the car.   Where can you learn more?  Go to https://www.[employer].net/patientEd and enter P072 to learn more about \"Well Visit, Ages 18 to 65: Care Instructions.\"  Current as of: August 6, 2023               Content Version: 13.9  © 7238-3287 [employer].   Care instructions adapted under license by [employer]. If you have questions about a medical condition or this instruction, always ask your healthcare professional. [employer] disclaims any warranty or liability for your use of this information.

## 2024-02-05 ENCOUNTER — HOSPITAL ENCOUNTER (OUTPATIENT)
Dept: ULTRASOUND IMAGING | Age: 44
Discharge: HOME | End: 2024-02-07
Payer: COMMERCIAL

## 2024-02-05 DIAGNOSIS — R79.89 ELEVATED LFTS: ICD-10-CM

## 2024-02-05 PROCEDURE — 76705 ECHO EXAM OF ABDOMEN: CPT

## 2024-02-05 NOTE — RESULT ENCOUNTER NOTE
Please notify patient that fatty liver disease noted on the ultrasound.  Recommend diet changes, no pop, avoiding alcohol, decrease fatty foods such as fast food.

## 2024-02-12 ENCOUNTER — TELEPHONE (OUTPATIENT)
Dept: PHARMACY | Age: 44
End: 2024-02-12

## 2024-02-12 ENCOUNTER — HOSPITAL ENCOUNTER (OUTPATIENT)
Dept: PHARMACY | Age: 44
Setting detail: THERAPIES SERIES
Discharge: HOME | End: 2024-02-12
Payer: COMMERCIAL

## 2024-02-12 ENCOUNTER — ENROLLMENT (OUTPATIENT)
Dept: PHARMACY | Age: 44
End: 2024-02-12

## 2024-02-12 VITALS
SYSTOLIC BLOOD PRESSURE: 156 MMHG | DIASTOLIC BLOOD PRESSURE: 82 MMHG | BODY MASS INDEX: 42.51 KG/M2 | HEART RATE: 77 BPM | WEIGHT: 232.4 LBS | OXYGEN SATURATION: 96 %

## 2024-02-12 PROCEDURE — 99213 OFFICE O/P EST LOW 20 MIN: CPT

## 2024-02-12 RX ORDER — LIRAGLUTIDE 6 MG/ML
0.6 INJECTION SUBCUTANEOUS DAILY
Qty: 4 ADJUSTABLE DOSE PRE-FILLED PEN SYRINGE | Refills: 3 | Status: SHIPPED | OUTPATIENT
Start: 2024-02-12

## 2024-02-12 RX ORDER — BLOOD-GLUCOSE METER
1 KIT MISCELLANEOUS DAILY PRN
Qty: 1 KIT | Refills: 0 | Status: SHIPPED | OUTPATIENT
Start: 2024-02-12

## 2024-02-12 NOTE — TELEPHONE ENCOUNTER
New Prescription to [facility] on [address]  Jardiance 10 mg #30  RF 1

## 2024-02-12 NOTE — DISCHARGE INSTRUCTIONS
Increase Metformin  mg  Two tablets twice daily  Check with Pharmacy About Victoza   Let our clinic know if any issues getting in  When prescription filled begin 0.6 mg under skin daily x 7 days, then increase to 1.2 mg  Start jardiance 10 mg daily  Schedule eye and foot exams  Check Fasting blood sugar daily ( when have not eaten for 8-10 hours) and then 2-3 hours after lunch, dinner or bedtime  Exercise at home as able

## 2024-02-12 NOTE — PROGRESS NOTES
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 Yes:    New prescription for Jardiance 10 mg to [facility] pharmacy  New prescription for Glucometer kit to [facility] pharmacy  New prescription for Victoza pens to [facility] pharmacy    Labs needed (A1c, lipids, microalbumin, SCr etc) no    Up to date with eye/foot exams no: Patient to schedule eye and foot exams    Patient is on appropriate statin, ACE/ARB, and ASA  Lipitor 10 mg daily      PLAN         Diabetic Action Plan is shown below. Patient and provider worked together to create goals which patient will work toward prior to the next appointment.    New Prescriptions for Glucometer Kit, Jardiance 10 mg and Victoza to [facility] pharmacy  Increase Metformin XR to 1000 mg bid  Physician Follow-up:  As scheduled    Medication Management Follow-up:   Diabetes Service  2 weeks    Electronically signed by Ashok Banuelos RPH on 2/12/2024 at 4:21 PM

## 2024-02-13 ENCOUNTER — TELEPHONE (OUTPATIENT)
Dept: PHARMACY | Age: 44
End: 2024-02-13

## 2024-02-13 ENCOUNTER — HOSPITAL ENCOUNTER (OUTPATIENT)
Age: 44
Discharge: HOME | End: 2024-02-13
Payer: COMMERCIAL

## 2024-02-13 LAB
25(OH)D3 SERPL-MCNC: 19.4 NG/ML
ALBUMIN SERPL-MCNC: 4.2 G/DL (ref 3.5–5.2)
ALP SERPL-CCNC: 109 U/L (ref 35–104)
ALT SERPL-CCNC: 35 U/L (ref 5–33)
AST SERPL-CCNC: 30 U/L
BILIRUB DIRECT SERPL-MCNC: 0.2 MG/DL
BILIRUB INDIRECT SERPL-MCNC: 0.4 MG/DL (ref 0–1)
BILIRUB SERPL-MCNC: 0.6 MG/DL (ref 0.3–1.2)
CREAT UR-MCNC: 192.5 MG/DL (ref 28–217)
MICROALBUMIN UR-MCNC: <12 MG/L
MICROALBUMIN/CREAT UR-RTO: NORMAL MCG/MG CREAT
PROT SERPL-MCNC: 7.4 G/DL (ref 6.4–8.3)

## 2024-02-13 PROCEDURE — 36415 COLL VENOUS BLD VENIPUNCTURE: CPT

## 2024-02-13 PROCEDURE — 80076 HEPATIC FUNCTION PANEL: CPT

## 2024-02-13 PROCEDURE — 82043 UR ALBUMIN QUANTITATIVE: CPT

## 2024-02-13 PROCEDURE — 82570 ASSAY OF URINE CREATININE: CPT

## 2024-02-13 PROCEDURE — 82306 VITAMIN D 25 HYDROXY: CPT

## 2024-02-13 NOTE — TELEPHONE ENCOUNTER
Called patient to let her know Elis went through for $35 at [facility]. [facility] was requiring a diagnosis code to run the prescription. Diagnosis code provided to pharmacy.     Called but patient did not answer and could not leave voicemail as mailbox is full.    Luisana Casey [facility], PharmD, BCACP  2/13/2024  4:48 PM

## 2024-02-14 NOTE — RESULT ENCOUNTER NOTE
Please notify patient that vitamin D is very low, please ensure she is taking her supplement as prescribed.      Liver ultrasound showing fatty liver disease once again I recommend diet changes, no pop, decrease fatty foods such as fast food.  Liver enzymes are slightly improved.    Other labs within normal limits.

## 2024-02-26 ENCOUNTER — HOSPITAL ENCOUNTER (OUTPATIENT)
Age: 44
Discharge: HOME | End: 2024-02-26

## 2024-02-26 ENCOUNTER — TELEPHONE (OUTPATIENT)
Dept: PHARMACY | Age: 44
End: 2024-02-26

## 2024-02-26 ENCOUNTER — APPOINTMENT (OUTPATIENT)
Dept: PHARMACY | Age: 44
End: 2024-02-26
Payer: COMMERCIAL

## 2024-02-26 ENCOUNTER — HOSPITAL ENCOUNTER (OUTPATIENT)
Age: 44
Setting detail: SPECIMEN
Discharge: HOME | End: 2024-02-26

## 2024-02-26 VITALS — OXYGEN SATURATION: 97 % | SYSTOLIC BLOOD PRESSURE: 122 MMHG | HEART RATE: 74 BPM | DIASTOLIC BLOOD PRESSURE: 70 MMHG

## 2024-02-26 DIAGNOSIS — E88.819 INSULIN RESISTANCE: ICD-10-CM

## 2024-02-26 DIAGNOSIS — E11.9 TYPE 2 DIABETES MELLITUS WITHOUT COMPLICATION, WITHOUT LONG-TERM CURRENT USE OF INSULIN (HCC): ICD-10-CM

## 2024-02-26 LAB
ANION GAP SERPL CALCULATED.3IONS-SCNC: 11 MMOL/L (ref 9–17)
BUN SERPL-MCNC: 13 MG/DL (ref 6–20)
CALCIUM SERPL-MCNC: 9.3 MG/DL (ref 8.6–10.4)
CHLORIDE SERPL-SCNC: 104 MMOL/L (ref 98–107)
CO2 SERPL-SCNC: 25 MMOL/L (ref 20–31)
CREAT SERPL-MCNC: 0.6 MG/DL (ref 0.5–0.9)
GFR SERPL CREATININE-BSD FRML MDRD: >60 ML/MIN/1.73M2
GLUCOSE SERPL-MCNC: 167 MG/DL (ref 70–99)
POTASSIUM SERPL-SCNC: 3.7 MMOL/L (ref 3.7–5.3)
SODIUM SERPL-SCNC: 140 MMOL/L (ref 135–144)

## 2024-02-26 PROCEDURE — 80048 BASIC METABOLIC PNL TOTAL CA: CPT

## 2024-02-26 PROCEDURE — 99213 OFFICE O/P EST LOW 20 MIN: CPT | Performed by: PHARMACIST

## 2024-02-26 PROCEDURE — 36415 COLL VENOUS BLD VENIPUNCTURE: CPT

## 2024-02-26 RX ORDER — BLOOD-GLUCOSE,RECEIVER,CONT
EACH MISCELLANEOUS
Qty: 1 EACH | Refills: 0 | Status: SHIPPED | OUTPATIENT
Start: 2024-02-26

## 2024-02-26 RX ORDER — BLOOD-GLUCOSE SENSOR
EACH MISCELLANEOUS
Qty: 2 EACH | Refills: 3 | Status: SHIPPED | OUTPATIENT
Start: 2024-02-26

## 2024-02-26 RX ORDER — METFORMIN HYDROCHLORIDE 500 MG/1
1000 TABLET, EXTENDED RELEASE ORAL 2 TIMES DAILY WITH MEALS
Qty: 360 TABLET | Refills: 1 | Status: SHIPPED | OUTPATIENT
Start: 2024-02-26

## 2024-02-26 NOTE — TELEPHONE ENCOUNTER
Prescription for Jardiance 10 mg daily #30 tablets with 1 refill.   Metformin  mg - 2 tablets twice daily #360 tablets with 1 refill  Insulin pen needles- Use to inject Victoza once daily. #100 pen needle box with 1 refill sent to [facility] per patient request  Prescription for Freestyle Ashlee 3 reader and sensors  Anne Braswell, Pharm D, [facility]  2/26/2024 10:22 AM

## 2024-02-26 NOTE — DISCHARGE INSTRUCTIONS
Continue with metformin XR 1000 mg twice daily  Continue with Victoza 1.2 mg daily  Continue with Jardiance 10 mg once daily  Will get BMP after appointment today  Refill for Jardiance, metformin and pen needles to [facility]  Annual eye and foot exam

## 2024-02-26 NOTE — PROGRESS NOTES
Diabetic Medication Management Program  [facility]  [address]   [place], [place] [address]  Phone: [phone]  Fax: [phone]    NAME: Debbie Keenan  MEDICAL RECORD NUMBER:  [id number]  AGE: 43 y.o.   GENDER: female  : 1980  EPISODE DATE:  2024     Ms. Keenan was referred to [facility] by Chris Rudolph CNP  Patient acknowledges working in consult agreement with clinical pharmacist and this provider.     Goals per referral:   Fasting blood glucose: < 130  Peak postprandial glucose: < 180  A1C: < 7    SUBJECTIVE     Ms. Keenan is a 43 y.o. female here for the Diabetes Service for self-management education, medication review including over the counter medications and herbal products, overall wellbeing assessment, transition of care and any needed adjustments with updates and recommendations communicated to the referring physician.       Patient Findings:     Medication changes  Yes: Jardiance started after last visit. Thinks she started 24. Victoza 0.6 mg started last week on 24   Diet changes  no  Activity changes  no  Emergency Room Visit or Hospitalization  no  Acute Illness/new problems  no  Symptoms of hypoglycemia  no - none  Symptoms of hyperglycemia  no - none  Medication adverse reactions  none    Comments:   Pt reports she has noticed an increase in urination with the Jardiance, but tolerable. No signs of UTI or yeast infection. Reminded to stay well hydrated with the Jardiance. Will get BMP after appt today.    Victoza started last week. Has noticed some nauseous for about an hour after the injection. She reports she is not sure if it is the thought of injecting or actually feeling sick. Does like the effect on her blood sugar and does want to continue. Denies any abdominal pain or upset stomach. Needs pen needle prescription. Bought without prescription last month.    Has been checking blood sugar multiple

## 2024-03-05 ENCOUNTER — OFFICE VISIT (OUTPATIENT)
Dept: FAMILY MEDICINE CLINIC | Age: 44
End: 2024-03-05
Payer: COMMERCIAL

## 2024-03-05 VITALS
DIASTOLIC BLOOD PRESSURE: 78 MMHG | SYSTOLIC BLOOD PRESSURE: 121 MMHG | TEMPERATURE: 98 F | HEART RATE: 99 BPM | OXYGEN SATURATION: 97 %

## 2024-03-05 DIAGNOSIS — J02.9 SORE THROAT: ICD-10-CM

## 2024-03-05 DIAGNOSIS — J02.9 ACUTE VIRAL PHARYNGITIS: Primary | ICD-10-CM

## 2024-03-05 LAB — S PYO AG THROAT QL: NORMAL

## 2024-03-05 PROCEDURE — 99213 OFFICE O/P EST LOW 20 MIN: CPT

## 2024-03-05 PROCEDURE — 87880 STREP A ASSAY W/OPTIC: CPT

## 2024-03-05 ASSESSMENT — ENCOUNTER SYMPTOMS
EYE PAIN: 0
VOMITING: 0
SORE THROAT: 1
VISUAL CHANGE: 0
CHANGE IN BOWEL HABIT: 0
EYE ITCHING: 0
SWOLLEN GLANDS: 0
EYE REDNESS: 0
NAUSEA: 0
COUGH: 0
ABDOMINAL PAIN: 0

## 2024-03-05 NOTE — PROGRESS NOTES
[facility] PHYSICIANS [facility] WALK-IN FAMILY MEDICINE  [address]  [address]  [facility] [address]  Dept: [phone]  Dept Fax: [phone]    Debbie Keenan is a 43 y.o. female who presents to the urgent care today for her medical conditions/complaints as notedbelow.  Debbie Keenan is c/o of Sore Throat (Onset this morning with feeling clammy and hot/cold)      HPI:     Patient presents to the Walk In Clinic for evaluation of a sore throat, onset this morning. Hx of acid reflux      Pharyngitis  This is a new problem. The current episode started today. The problem occurs constantly. The problem has been gradually worsening. Associated symptoms include chills, diaphoresis, fatigue, headaches and a sore throat. Pertinent negatives include no abdominal pain, anorexia, arthralgias, change in bowel habit, chest pain, congestion, coughing, fever, joint swelling, myalgias, nausea, neck pain, numbness, rash, swollen glands, urinary symptoms, vertigo, visual change, vomiting or weakness. Nothing aggravates the symptoms. She has tried nothing for the symptoms.       Past Medical History:   Diagnosis Date    Acid reflux     Anxiety 2/3/2022    Bruises easily     Insulin resistance     Mixed hyperlipidemia 11/15/2021    Obesity, Class III, BMI 40-49.9 (morbid obesity) ([facility]) 7/28/2015    Type 2 diabetes mellitus with hyperglycemia 1/29/2024    Vision abnormalities         Current Outpatient Medications   Medication Sig Dispense Refill    metFORMIN (GLUCOPHAGE-XR) 500 MG extended release tablet Take 2 tablets by mouth 2 times daily (with meals) 360 tablet 1    empagliflozin (JARDIANCE) 10 MG tablet Take 1 tablet by mouth daily 30 tablet 1    Insulin Pen Needle 32G X 6 MM MISC Use to inject Victoza once daily 100 each 1    Continuous Blood Gluc  (FREESTYLE JOAN 3 READER) NINA Use to check blood sugar 1 each 0    Liraglutide (VICTOZA) 18 MG/3ML SOPN SC

## 2024-03-11 DIAGNOSIS — E11.9 TYPE 2 DIABETES MELLITUS WITHOUT COMPLICATION, WITHOUT LONG-TERM CURRENT USE OF INSULIN (HCC): ICD-10-CM

## 2024-03-11 DIAGNOSIS — E88.819 INSULIN RESISTANCE: ICD-10-CM

## 2024-03-11 RX ORDER — BLOOD-GLUCOSE SENSOR
EACH MISCELLANEOUS
Qty: 2 EACH | Refills: 3 | Status: SHIPPED | OUTPATIENT
Start: 2024-03-11

## 2024-03-11 NOTE — TELEPHONE ENCOUNTER
Please Approve or Refuse.  Send to Pharmacy per Pt's Request:      Next Visit Date:  4/29/2024   Last Visit Date: 1/29/2024    Hemoglobin A1C (%)   Date Value   01/18/2024 9.4 (H)   04/01/2022 6.1 (H)   10/25/2021 5.5             ( goal A1C is < 7)   BP Readings from Last 3 Encounters:   03/05/24 121/78   02/26/24 122/70   02/12/24 (!) 156/82          (goal 120/80)  BUN   Date Value Ref Range Status   02/26/2024 13 6 - 20 mg/dL Final     Creatinine   Date Value Ref Range Status   02/26/2024 0.6 0.5 - 0.9 mg/dL Final     Potassium   Date Value Ref Range Status   02/26/2024 3.7 3.7 - 5.3 mmol/L Final

## 2024-03-25 ENCOUNTER — HOSPITAL ENCOUNTER (OUTPATIENT)
Age: 44
Setting detail: SPECIMEN
Discharge: HOME | End: 2024-03-25
Payer: COMMERCIAL

## 2024-03-25 ENCOUNTER — HOSPITAL ENCOUNTER (OUTPATIENT)
Dept: PHARMACY | Age: 44
Setting detail: THERAPIES SERIES
Discharge: HOME | End: 2024-03-25
Payer: COMMERCIAL

## 2024-03-25 VITALS
WEIGHT: 222.7 LBS | DIASTOLIC BLOOD PRESSURE: 72 MMHG | OXYGEN SATURATION: 97 % | BODY MASS INDEX: 40.73 KG/M2 | SYSTOLIC BLOOD PRESSURE: 117 MMHG | HEART RATE: 71 BPM

## 2024-03-25 LAB
ANION GAP SERPL CALCULATED.3IONS-SCNC: 11 MMOL/L (ref 9–17)
BUN SERPL-MCNC: 14 MG/DL (ref 6–20)
CALCIUM SERPL-MCNC: 9.5 MG/DL (ref 8.6–10.4)
CHLORIDE SERPL-SCNC: 103 MMOL/L (ref 98–107)
CO2 SERPL-SCNC: 25 MMOL/L (ref 20–31)
CREAT SERPL-MCNC: 0.5 MG/DL (ref 0.5–0.9)
GFR SERPL CREATININE-BSD FRML MDRD: >90 ML/MIN/1.73M2
GLUCOSE SERPL-MCNC: 116 MG/DL (ref 70–99)
POTASSIUM SERPL-SCNC: 4.1 MMOL/L (ref 3.7–5.3)
SODIUM SERPL-SCNC: 139 MMOL/L (ref 135–144)

## 2024-03-25 PROCEDURE — 36415 COLL VENOUS BLD VENIPUNCTURE: CPT

## 2024-03-25 PROCEDURE — 80048 BASIC METABOLIC PNL TOTAL CA: CPT

## 2024-03-25 PROCEDURE — 99212 OFFICE O/P EST SF 10 MIN: CPT

## 2024-03-25 NOTE — DISCHARGE INSTRUCTIONS
Continue:  Metformin  mg Two tablets twice daily  Jardiance 10 mg daily  Increase Victoza to 1.8 mg daily  Will get labs after appt today  Schedule Annual Eye and Foot Exam  Eat more lean meats and veggies  Exercise as able as weather improves

## 2024-03-25 NOTE — PROGRESS NOTES
LABA1C 9.4 (H) 01/18/2024    LABA1C 6.1 (H) 04/01/2022    LABA1C 5.5 10/25/2021        Eating patterns: Sometimes skips lunch    Exercise patterns: Walking [employer] [address]     Blood Glucose Testing:  Glucometer    Current Diabetic Medications:    Metformin XR 1000 mg bid  Victoza 1.2 mg daily  Jardiance 10 mg daily       Diabetic Regimen/Compliance: yes     Other Medication Compliance: yes    Refills needed (diabetes medications/testing supplies). no    Labs needed (A1c, lipids, microalbumin, SCr etc) Yes: BMP Today    Up to date with eye/foot exams no: Patient needs to schedule    Patient is on appropriate statin, ACE/ARB, and ASA Yes Lipitor 10 mg daily  Urine Micro-Albumen less 12        PLAN         Diabetic Action Plan is shown below. Patient and provider worked together to create goals which patient will work toward prior to the next appointment.    Continue Metformin XR 1000 mg bid  Jardiance 10 mg daily  Increase Victoza to 1.8 mg daily  Schedule annual eye and foot exams  Walk more as able  Eat more lean meats and veggies        Physician Follow-up:  As scheduled    Medication Management Follow-up:   Diabetes Service  4 weeks    Electronically signed by Ashok Banuelos RPH on 3/25/2024 at 12:44 PM

## 2024-04-02 ENCOUNTER — TELEPHONE (OUTPATIENT)
Dept: PHARMACY | Age: 44
End: 2024-04-02

## 2024-04-02 RX ORDER — LIRAGLUTIDE 6 MG/ML
1.8 INJECTION SUBCUTANEOUS DAILY
Qty: 10 ADJUSTABLE DOSE PRE-FILLED PEN SYRINGE | Refills: 3 | Status: SHIPPED | OUTPATIENT
Start: 2024-04-02

## 2024-04-11 ENCOUNTER — TELEPHONE (OUTPATIENT)
Age: 44
End: 2024-04-11

## 2024-04-11 NOTE — TELEPHONE ENCOUNTER
Patient called office requesting to reschedule her new patient appointment. Patient told office staff that she would need a later appointment, possibly around 3 PM.

## 2024-04-25 ENCOUNTER — TELEPHONE (OUTPATIENT)
Dept: FAMILY MEDICINE CLINIC | Age: 44
End: 2024-04-25

## 2024-04-25 NOTE — TELEPHONE ENCOUNTER
Pt called in stating that she needs to have FMLA (mental health) refilled out because it wasn't turned in on time. Pt has a death in the family and it is affecting her mental health. Pts employer will not excuse her and she is requesting a letter excusing her from work for 2024 so she may attend the .    Please advise if okay to provide note to patient.Pt states FMLA paperwork can wait until Chris returns.

## 2024-04-29 ENCOUNTER — HOSPITAL ENCOUNTER (OUTPATIENT)
Dept: PHARMACY | Age: 44
Setting detail: THERAPIES SERIES
Discharge: HOME | End: 2024-04-29

## 2024-04-29 ENCOUNTER — HOSPITAL ENCOUNTER (OUTPATIENT)
Age: 44
Setting detail: SPECIMEN
Discharge: HOME | End: 2024-04-29
Payer: COMMERCIAL

## 2024-04-29 VITALS
DIASTOLIC BLOOD PRESSURE: 95 MMHG | HEART RATE: 67 BPM | WEIGHT: 217.9 LBS | SYSTOLIC BLOOD PRESSURE: 126 MMHG | OXYGEN SATURATION: 97 % | BODY MASS INDEX: 39.85 KG/M2

## 2024-04-29 LAB
EST. AVERAGE GLUCOSE BLD GHB EST-MCNC: 126 MG/DL
HBA1C MFR BLD: 6 % (ref 4–6)

## 2024-04-29 PROCEDURE — 99213 OFFICE O/P EST LOW 20 MIN: CPT

## 2024-04-29 PROCEDURE — 36415 COLL VENOUS BLD VENIPUNCTURE: CPT

## 2024-04-29 PROCEDURE — 83036 HEMOGLOBIN GLYCOSYLATED A1C: CPT

## 2024-04-29 NOTE — PROGRESS NOTES
pain, Disp: 1 each, Rfl: 0    folic acid (FOLVITE) 1 MG tablet, Take 1 tablet by mouth daily, Disp: 90 tablet, Rfl: 4    Prenatal Vit-Fe Fumarate-FA (PREPLUS) 27-1 MG TABS, Take 1 tablet by mouth daily, Disp: 90 tablet, Rfl: 4    Immunizations:   Most Recent Immunizations   Administered Date(s) Administered    COVID-19, MODERNA BLUE border, Primary or Immunocompromised, (age 12y+), IM, 100 mcg/0.5mL 2022       Home Blood Glucose Results:     FB, 141,138, 126, 140, 126, 122, 118, 132, 133, 155, 132, 136, 118    Postprandial:  152, 171, 125, 138, 138, 145, 162, 169, 172    Blood glucose trends noted:  Post prandial controlled  Some FBS elevated - patient attributes this to mostly diet control.  ASSESSMENT     Recent A1c: pending new results -today    Lab Results   Component Value Date    LABA1C 9.4 (H) 2024    LABA1C 6.1 (H) 2022    LABA1C 5.5 10/25/2021        Eating patterns: Sometimes skips lunch    Exercise patterns: Walking [employer] [address] - office is big, average 5-10,000 steps daily      Blood Glucose Testing:  Glucometer ; states one routinely once in the morning - will try to check prior to bedtime     Current Diabetic Medications:    Metformin XR 1000 mg twice daily  Victoza 1.8 mg daily  Jardiance 10 mg daily       Diabetic Regimen/Compliance: mostly - patient went without Victoza for 2 weeks due to national backorder. States will then sometimes miss doses due to not remembering and worrying about taking doses too close together. Discussed with patient appropriate times to take Victoza (see above).      Other Medication Compliance: yes    Refills needed (diabetes medications/testing supplies). no    Labs needed (A1c, lipids, microalbumin, SCr etc) Yes: new A1C today    Up to date with eye/foot exams no: Patient needs to schedule    Patient is on appropriate statin, ACE/ARB, and ASA Yes Lipitor 10 mg daily  Urine Micro-Albumen less 12        PLAN   Diabetic Action Plan is shown below.

## 2024-04-29 NOTE — DISCHARGE INSTRUCTIONS
Schedule updated eye and foot exam   Continue Metformin XR 1000 mg twice daily, Victoza 1.8 mg daily, jardiance 10 mg daily   Get updated A1C today   Will call you with the updated results  Continue to check blood sugars once in the morning and then once after a meal (dinner or lunch)  Remember to try to take Victoza daily - leaving at least 12 hours in between doses

## 2024-05-06 ENCOUNTER — OFFICE VISIT (OUTPATIENT)
Dept: FAMILY MEDICINE CLINIC | Age: 44
End: 2024-05-06
Payer: COMMERCIAL

## 2024-05-06 VITALS
SYSTOLIC BLOOD PRESSURE: 102 MMHG | TEMPERATURE: 97.1 F | BODY MASS INDEX: 40.45 KG/M2 | DIASTOLIC BLOOD PRESSURE: 66 MMHG | HEART RATE: 79 BPM | WEIGHT: 219.8 LBS | OXYGEN SATURATION: 98 % | HEIGHT: 62 IN

## 2024-05-06 DIAGNOSIS — E78.1 HIGH TRIGLYCERIDES: ICD-10-CM

## 2024-05-06 DIAGNOSIS — M25.519 CHRONIC SHOULDER PAIN, UNSPECIFIED LATERALITY: ICD-10-CM

## 2024-05-06 DIAGNOSIS — E55.9 VITAMIN D DEFICIENCY: ICD-10-CM

## 2024-05-06 DIAGNOSIS — G89.29 CHRONIC SHOULDER PAIN, UNSPECIFIED LATERALITY: ICD-10-CM

## 2024-05-06 DIAGNOSIS — E11.65 TYPE 2 DIABETES MELLITUS WITH HYPERGLYCEMIA, WITHOUT LONG-TERM CURRENT USE OF INSULIN (HCC): Primary | ICD-10-CM

## 2024-05-06 PROCEDURE — 3044F HG A1C LEVEL LT 7.0%: CPT | Performed by: NURSE PRACTITIONER

## 2024-05-06 PROCEDURE — 99214 OFFICE O/P EST MOD 30 MIN: CPT | Performed by: NURSE PRACTITIONER

## 2024-05-06 RX ORDER — CHLORAL HYDRATE 500 MG
2000 CAPSULE ORAL DAILY
Qty: 60 CAPSULE | Refills: 3 | Status: SHIPPED | OUTPATIENT
Start: 2024-05-06

## 2024-05-06 SDOH — ECONOMIC STABILITY: FOOD INSECURITY: WITHIN THE PAST 12 MONTHS, THE FOOD YOU BOUGHT JUST DIDN'T LAST AND YOU DIDN'T HAVE MONEY TO GET MORE.: NEVER TRUE

## 2024-05-06 SDOH — ECONOMIC STABILITY: INCOME INSECURITY: HOW HARD IS IT FOR YOU TO PAY FOR THE VERY BASICS LIKE FOOD, HOUSING, MEDICAL CARE, AND HEATING?: NOT HARD AT ALL

## 2024-05-06 SDOH — ECONOMIC STABILITY: FOOD INSECURITY: WITHIN THE PAST 12 MONTHS, YOU WORRIED THAT YOUR FOOD WOULD RUN OUT BEFORE YOU GOT MONEY TO BUY MORE.: NEVER TRUE

## 2024-05-06 ASSESSMENT — PATIENT HEALTH QUESTIONNAIRE - PHQ9
2. FEELING DOWN, DEPRESSED OR HOPELESS: NOT AT ALL
SUM OF ALL RESPONSES TO PHQ QUESTIONS 1-9: 0
SUM OF ALL RESPONSES TO PHQ9 QUESTIONS 1 & 2: 0
1. LITTLE INTEREST OR PLEASURE IN DOING THINGS: NOT AT ALL

## 2024-05-06 ASSESSMENT — ENCOUNTER SYMPTOMS
BACK PAIN: 0
NAUSEA: 0
VOMITING: 0
WHEEZING: 0
DIARRHEA: 0
SHORTNESS OF BREATH: 0
BLOOD IN STOOL: 0
ABDOMINAL DISTENTION: 0
CONSTIPATION: 0
COUGH: 0
CHEST TIGHTNESS: 0
ABDOMINAL PAIN: 0

## 2024-05-06 NOTE — PROGRESS NOTES
Debbie Keenan (:  1980) is a 43 y.o. female,Established patient, here for evaluation of the following chief complaint(s): Discuss Labs and FMLA PAPERWORK      ASSESSMENT/PLAN:    Debbie received counseling on the following healthy behaviors: nutrition, exercise, and medication adherence  Reviewed prior labs and health maintenance  Discussed use, benefit, and side effects of prescribed medications.   Barriers to medication compliance addressed.   Patient given educational materials - see patient instructions  All patient questions answered.  Patient voiced understanding.  The patient's past medical,surgical, social, and family history as well as her current medications and allergies were reviewed as documented in today's encounter.    Medications, labs, diagnostic studies, consultations and follow-up as documented in this encounter.    Debbie was seen today for discuss labs and fmla paperwork.    Diagnoses and all orders for this visit:    Type 2 diabetes mellitus with hyperglycemia, without long-term current use of insulin (HCC)  -Improving  -Continue current regimen  -Continue to follow with medication management  -Referral sent [facility] dietitian  [facility]    Vitamin D deficiency  -Slightly improved according to recent lab work, but still very low  -Patient has missed doses  -Continue current regimen, plan to recheck in 3 months  -     Vitamin D 25 Hydroxy; Future    High triglycerides  -Improved, but still elevated  -Has not started her fish oil, continue Lipitor, new prescription sent  -     Omega-3 Fatty Acids (FISH OIL) 1000 MG capsule; Take 2 capsules by mouth daily    Chronic shoulder pain, unspecified laterality  -Ongoing for years  -Not improving or worsening  -Likely overuse  -Plan for referral to physical therapy  [facility]    Prior to Visit Medications    Medication Sig Taking? Authorizing Provider   Omega-3

## 2024-05-06 NOTE — PROGRESS NOTES
Visit Information    Have you changed or started any medications since your last visit including any over-the-counter medicines, vitamins, or herbal medicines? no   Have you stopped taking any of your medications? Is so, why? -  no  Are you having any side effects from any of your medications? - no    Have you seen any other physician or provider since your last visit?  no   Have you had any other diagnostic tests since your last visit?  yes -    Have you been seen in the emergency room and/or had an admission in a hospital since we last saw you?  no   Have you had your routine dental cleaning in the past 6 months?  no     Do you have an active MyChart account? If no, what is the barrier?  Yes    Patient Care Team:  Chris Rudolph APRN - CNP as PCP - General (Nurse Practitioner)  Chris Rudolph APRN - CNP as PCP - Empaneled Provider  Sourav Smith DO as Consulting Physician (Obstetrics & Gynecology)    Medical History Review  Past Medical, Family, and Social History reviewed and does contribute to the patient presenting condition    Health Maintenance   Topic Date Due    Hepatitis B vaccine (1 of 3 - 3-dose series) Never done    Diabetic retinal exam  Never done    Cervical cancer screen  01/15/2018    Breast cancer screen  10/25/2022    Colorectal Cancer Screen  Never done    Pneumococcal 0-64 years Vaccine (1 of 2 - PCV) 09/01/2024 (Originally 8/5/1984)    COVID-19 Vaccine (3 - 2023-24 season) 09/01/2024 (Originally 9/1/2023)    DTaP/Tdap/Td vaccine (1 - Tdap) 01/29/2025 (Originally 8/5/1997)    Flu vaccine (Season Ended) 01/29/2025 (Originally 8/1/2024)    Lipids  01/26/2025    Depression Screen  01/28/2025    Diabetic foot exam  01/29/2025    Diabetic Alb to Cr ratio (uACR) test  02/13/2025    GFR test (Diabetes, CKD 3-4, OR last GFR 15-59)  03/25/2025    A1C test (Diabetic or Prediabetic)  04/29/2025    Hepatitis C screen  Completed    HIV screen  Completed    Hepatitis A vaccine  Aged Out    Hib vaccine

## 2024-05-08 ENCOUNTER — OFFICE VISIT (OUTPATIENT)
Age: 44
End: 2024-05-08

## 2024-05-08 DIAGNOSIS — F43.22 ADJUSTMENT DISORDER WITH ANXIOUS MOOD: Primary | ICD-10-CM

## 2024-05-08 PROCEDURE — 90791 PSYCH DIAGNOSTIC EVALUATION: CPT | Performed by: SOCIAL WORKER

## 2024-05-08 NOTE — PROGRESS NOTES
NINA, Use to check blood sugar (Patient not taking: Reported on 3/25/2024), Disp: 1 each, Rfl: 0    glucose monitoring kit, 1 kit by Does not apply route daily as needed (Check Blood Sugars 4x daily) Needs Glucometer, Test Strips and Lancets, Disp: 1 kit, Rfl: 0    vitamin D (ERGOCALCIFEROL) 1.25 MG (74502 UT) CAPS capsule, Take 1 capsule by mouth once a week (Patient taking differently: Take 1 capsule by mouth once a week Sundays), Disp: 12 capsule, Rfl: 1    folic acid (FOLVITE) 1 MG tablet, Take 1 tablet by mouth daily, Disp: 90 tablet, Rfl: 4    Prenatal Vit-Fe Fumarate-FA (PREPLUS) 27-1 MG TABS, Take 1 tablet by mouth daily (Patient not taking: Reported on 5/13/2024), Disp: 90 tablet, Rfl: 4     FAMILY MEDICAL/MH HISTORY   Her family history includes Breast Cancer in her mother; Diabetes in her sister, sister, and sister; Early Death in her father; Heart Disease in her father; Other in her mother, sister, sister, sister, and sister.    PATIENT MENTAL HEALTH HISTORY  None.    PSYCHOSOCIAL HISTORY  Current living situation: Pt reported she resides in her own home with boyfriend.     Work/Education: Pt reported she works for the [employer], started 2018.     Support system: Pt reported she is in a relationship. Pt has social support/friends she spends time with.     Episcopalian/Spirituality: Pt did not disclose.     DRUG AND ALCOHOL CURRENT USE/HISTORY  TOBACCO:  She reports that she quit smoking about 5 months ago. Her smoking use included cigarettes. She has a 0.3 pack-year smoking history. She has been exposed to tobacco smoke. She has never used smokeless tobacco.  ALCOHOL:  She reports current alcohol use.  OTHER SUBSTANCES: She reports no history of drug use.     ASSESSMENT  Debbie Keenan presented to the appointment today for evaluation and treatment of symptoms of anxiety. She is currently deemed no risk to herself or others and meets criteria for Adjustment disorder with anxious mood. She will

## 2024-05-12 DIAGNOSIS — E88.819 INSULIN RESISTANCE: ICD-10-CM

## 2024-05-12 DIAGNOSIS — E11.9 TYPE 2 DIABETES MELLITUS WITHOUT COMPLICATION, WITHOUT LONG-TERM CURRENT USE OF INSULIN (HCC): ICD-10-CM

## 2024-05-13 ENCOUNTER — HOSPITAL ENCOUNTER (OUTPATIENT)
Dept: PHARMACY | Age: 44
Setting detail: THERAPIES SERIES
Discharge: HOME | End: 2024-05-13
Payer: COMMERCIAL

## 2024-05-13 VITALS
SYSTOLIC BLOOD PRESSURE: 119 MMHG | DIASTOLIC BLOOD PRESSURE: 69 MMHG | WEIGHT: 219 LBS | HEART RATE: 78 BPM | OXYGEN SATURATION: 98 % | BODY MASS INDEX: 40.06 KG/M2

## 2024-05-13 PROCEDURE — 99213 OFFICE O/P EST LOW 20 MIN: CPT | Performed by: PHARMACIST

## 2024-05-13 RX ORDER — EMPAGLIFLOZIN 10 MG/1
10 TABLET, FILM COATED ORAL DAILY
Qty: 30 TABLET | Refills: 3 | Status: SHIPPED | OUTPATIENT
Start: 2024-05-13

## 2024-05-13 NOTE — DISCHARGE INSTRUCTIONS
Schedule updated eye and foot exam   Continue Metformin XR 1000 mg twice daily, Victoza 1.8 mg daily, jardiance 10 mg daily   Continue to check blood sugars once in the morning and then once after a meal (dinner or lunch)

## 2024-05-13 NOTE — PROGRESS NOTES
Diabetic Medication Management Program  [facility]  [address]   [place], [place] [address]  Phone: [phone]  Fax: [phone]    NAME: Debbie Keenan  MEDICAL RECORD NUMBER:  [id number]  AGE: 43 y.o.   GENDER: female  : 1980  EPISODE DATE:  2024     Ms. Keenan was referred to [facility] by  Chris Rudolph.  Patient acknowledges working in consult agreement with clinical pharmacist and this provider.     Goals per referral:   Fasting blood glucose: < 130  Peak postprandial glucose: < 180  A1C: < 7    SUBJECTIVE     Ms. Keenan is a 43 y.o. female here for the Diabetes Service for self-management education, medication review including over the counter medications and herbal products, overall wellbeing assessment, transition of care and any needed adjustments with updates and recommendations communicated to the referring physician.       Patient Findings:     Medication changes  no  Diet changes  Yes: Patient has cut out regular pop in the past 3 months. Will occasionally drink diet pop.      Activity changes  no  Emergency Room Visit or Hospitalization  no  Acute Illness/new problems  no  Symptoms of hypoglycemia  none  Symptoms of hyperglycemia  none  Medication adverse reactions  none    Comments:   States that she skips meals a lot. Skips lunch at work due to time constraints. Is struggling with meal prepping to bring healthy quick options to work. Tries to grab healthy options from the vending machine. Has been trying to drink protein with coffee in the morning to help keep her full. Continues to watch carbs.  Has had a lot going on lately-  and a birthday. Had some sweets these days. Admits that she is not checking blood sugar every day. One reason due to being busy but also not checking when she knows its going to be high due to the sweets. Encouraged her to continue to check blood sugar daily especially with improved A1c

## 2024-05-14 DIAGNOSIS — E11.9 TYPE 2 DIABETES MELLITUS WITHOUT COMPLICATION, WITHOUT LONG-TERM CURRENT USE OF INSULIN (HCC): ICD-10-CM

## 2024-05-14 RX ORDER — ATORVASTATIN CALCIUM 10 MG/1
10 TABLET, FILM COATED ORAL DAILY
Qty: 30 TABLET | Refills: 3 | Status: SHIPPED | OUTPATIENT
Start: 2024-05-14

## 2024-05-14 NOTE — TELEPHONE ENCOUNTER
Please Approve or Refuse.  Send to Pharmacy per Pt's Request:      Next Visit Date:  8/7/2024   Last Visit Date: 5/6/2024    Hemoglobin A1C (%)   Date Value   04/29/2024 6.0   01/18/2024 9.4 (H)   04/01/2022 6.1 (H)             ( goal A1C is < 7)   BP Readings from Last 3 Encounters:   05/13/24 119/69   05/06/24 102/66   04/29/24 (!) 126/95          (goal 120/80)  BUN   Date Value Ref Range Status   03/25/2024 14 6 - 20 mg/dL Final     Creatinine   Date Value Ref Range Status   03/25/2024 0.5 0.5 - 0.9 mg/dL Final     Potassium   Date Value Ref Range Status   03/25/2024 4.1 3.7 - 5.3 mmol/L Final

## 2024-07-08 ENCOUNTER — TELEPHONE (OUTPATIENT)
Dept: PHARMACY | Age: 44
End: 2024-07-08

## 2024-07-08 ENCOUNTER — HOSPITAL ENCOUNTER (OUTPATIENT)
Age: 44
Setting detail: SPECIMEN
Discharge: HOME | End: 2024-07-08

## 2024-07-08 ENCOUNTER — HOSPITAL ENCOUNTER (OUTPATIENT)
Dept: PHARMACY | Age: 44
Setting detail: THERAPIES SERIES
Discharge: HOME | End: 2024-07-08
Payer: COMMERCIAL

## 2024-07-08 VITALS
WEIGHT: 207 LBS | BODY MASS INDEX: 37.86 KG/M2 | HEART RATE: 81 BPM | OXYGEN SATURATION: 97 % | SYSTOLIC BLOOD PRESSURE: 114 MMHG | DIASTOLIC BLOOD PRESSURE: 58 MMHG

## 2024-07-08 DIAGNOSIS — E88.819 INSULIN RESISTANCE: ICD-10-CM

## 2024-07-08 DIAGNOSIS — E11.9 TYPE 2 DIABETES MELLITUS WITHOUT COMPLICATION, WITHOUT LONG-TERM CURRENT USE OF INSULIN (HCC): ICD-10-CM

## 2024-07-08 PROCEDURE — 99213 OFFICE O/P EST LOW 20 MIN: CPT | Performed by: PHARMACIST

## 2024-07-08 RX ORDER — METFORMIN HYDROCHLORIDE 500 MG/1
1000 TABLET, EXTENDED RELEASE ORAL 2 TIMES DAILY WITH MEALS
Qty: 360 TABLET | Refills: 1 | Status: SHIPPED | OUTPATIENT
Start: 2024-07-08

## 2024-07-08 RX ORDER — LIRAGLUTIDE 6 MG/ML
1.8 INJECTION SUBCUTANEOUS DAILY
Qty: 9 ML | Refills: 3 | Status: SHIPPED | OUTPATIENT
Start: 2024-07-08

## 2024-07-08 NOTE — TELEPHONE ENCOUNTER
Patient called the clinic back. Reports that Victoza is available, but now generic. Due to this, insurance is not covering the generic due to it being so new. They will only cover brand which the pharmacies are not able to get. Patient asks about Trulicity as [facility] had Trulicity available. Has never tried Trulicity and would prefer a once weekly injection. Will send prescription to check insurance coverage.   Anne Braswell, Pharm D, BCPS, CACP  [facility]  7/8/2024 4:12 PM

## 2024-07-08 NOTE — TELEPHONE ENCOUNTER
Prescription for metformin  mg 2 tablets twice daily #360 tabs with 1 refill sent to [facility]. Receipt confirmed by pharmacy  Anne Braswell, Pharm D, BCPS, CACP  [facility]  7/8/2024 8:08 AM

## 2024-07-08 NOTE — PROGRESS NOTES
(Patient not taking: Reported on 3/25/2024), Disp: 1 each, Rfl: 0    glucose monitoring kit, 1 kit by Does not apply route daily as needed (Check Blood Sugars 4x daily) Needs Glucometer, Test Strips and Lancets, Disp: 1 kit, Rfl: 0    Immunizations:   Most Recent Immunizations   Administered Date(s) Administered    COVID-19, MODERNA BLUE border, Primary or Immunocompromised, (age 12y+), IM, 100 mcg/0.5mL 2022       Home Blood Glucose Results: See below  More readings than last visit, but admits she has been \"lazy\" about checking or does not check with the wrong foods.   (Most recent first)  FB, 122, 132, 120, 137, 142, 142, 138, 117, 132, 128, 119, 113    Before lunch:  131    Postprandial:  172, 150, 127, 185* (ate candy with dinner), 190* (mash potatoes), 115, 146    Blood glucose trends noted:  Blood sugar mostly controlled. Has some higher post prandial readings due to diet. Pt also has a lot of stress in home life.     ASSESSMENT     Recent A1c: Yes 6.     Lab Results   Component Value Date    LABA1C 6.0 2024    LABA1C 9.4 (H) 2024    LABA1C 6.1 (H) 2022        Eating patterns: Cut out all regular pop. Occasional diet pop. Every once in awhile a piece of candy  Breakfast- protein shake or coffee with protein drink in it.  Sausage with cheese.   Lunch- Sometimes skips lunch. Trying to meal prep so that she has healthy easy to grab meals for lunch at work.   Evening- Largest meal. Meat with vegetables; chicken with vegetables.     Exercise patterns: Walking [employer] [address] - office is big, average 5-10,000 steps daily      Blood Glucose Testing:  Glucometer ; Checking once in the morning and occasionally adding second reading    Current Diabetic Medications:    Metformin XR 1000 mg twice daily  Jardiance 10 mg daily    Victoza 1.8 mg daily -- back order and has not been on     Diabetic Regimen/Compliance: Missed Victoza for the past 3-4 weeks due to shortage issue     Other Medication

## 2024-07-08 NOTE — TELEPHONE ENCOUNTER
We were able to locate Victoza in stock at [facility]. Prescription for Victoza 1.8 mg once daily #9ml 3 refills. Receipt confirmed by pharmacy  Anne Braswell, Pharm D, BCPS, CACP  [facility]  7/8/2024 12:23 PM

## 2024-07-08 NOTE — DISCHARGE INSTRUCTIONS
Schedule updated eye and foot exam   Continue Metformin XR 1000 mg twice daily  Continue Jardiance 10 mg daily   When you are able to refill Victoza, start back on 0.6 mg daily x 1 week; then 1.2 mg daily; and then resume your previous Victoza 1.8 mg daily   Continue to check blood sugars once in the morning and then once after a meal (dinner or lunch) about 2 hours a few times per week

## 2024-07-26 ENCOUNTER — PATIENT MESSAGE (OUTPATIENT)
Dept: FAMILY MEDICINE CLINIC | Age: 44
End: 2024-07-26

## 2024-08-07 DIAGNOSIS — E55.9 VITAMIN D DEFICIENCY: ICD-10-CM

## 2024-08-08 DIAGNOSIS — E11.9 TYPE 2 DIABETES MELLITUS WITHOUT COMPLICATION, WITHOUT LONG-TERM CURRENT USE OF INSULIN (HCC): ICD-10-CM

## 2024-08-08 RX ORDER — ATORVASTATIN CALCIUM 10 MG/1
10 TABLET, FILM COATED ORAL DAILY
Qty: 90 TABLET | Refills: 0 | Status: SHIPPED | OUTPATIENT
Start: 2024-08-08

## 2024-08-08 RX ORDER — ERGOCALCIFEROL 1.25 MG/1
50000 CAPSULE ORAL WEEKLY
Qty: 12 CAPSULE | Refills: 1 | Status: SHIPPED | OUTPATIENT
Start: 2024-08-08

## 2024-08-08 NOTE — TELEPHONE ENCOUNTER
Please Approve or Refuse.  Send to Pharmacy per Pt's Request:      Next Visit Date:  8/19/2024   Last Visit Date: 5/6/2024    Hemoglobin A1C (%)   Date Value   04/29/2024 6.0   01/18/2024 9.4 (H)   04/01/2022 6.1 (H)             ( goal A1C is < 7)   BP Readings from Last 3 Encounters:   07/08/24 (!) 114/58   05/13/24 119/69   05/06/24 102/66          (goal 120/80)  BUN   Date Value Ref Range Status   03/25/2024 14 6 - 20 mg/dL Final     Creatinine   Date Value Ref Range Status   03/25/2024 0.5 0.5 - 0.9 mg/dL Final     Potassium   Date Value Ref Range Status   03/25/2024 4.1 3.7 - 5.3 mmol/L Final

## 2024-08-09 ENCOUNTER — TELEPHONE (OUTPATIENT)
Age: 44
End: 2024-08-09

## 2024-08-09 NOTE — TELEPHONE ENCOUNTER
Pt returned call. Does not need a refill of Victoza. States she requested it from the pharmacy by accident. She is taking Trulicity and has enough for another month. She will continue with Trulicity. Would like to discuss in another month her appointment about possibly switching back to Victoza if insurance will cover the generic.   Anne Braswell, Pharm D, BCPS, CACP  [facility]  8/9/2024 2:37 PM

## 2024-08-09 NOTE — TELEPHONE ENCOUNTER
Received refill request for Victoza from [facility]. Last telephone note from 7/8/24 indicated that Victoza was not covered on patient insurance and she would be trying Trulicity.  Called patient to see if she is taking Victoza or Trulicity. No answer. VM left requesting a call back to confirm which she is taking prior to sending refill  Anne Braswell, Pharm D, BCPS, CACP  [facility]  8/9/2024 2:28 PM

## 2024-08-19 ENCOUNTER — OFFICE VISIT (OUTPATIENT)
Dept: FAMILY MEDICINE CLINIC | Age: 44
End: 2024-08-19
Payer: COMMERCIAL

## 2024-08-19 VITALS
DIASTOLIC BLOOD PRESSURE: 70 MMHG | TEMPERATURE: 97.7 F | HEIGHT: 62 IN | OXYGEN SATURATION: 98 % | WEIGHT: 199.2 LBS | HEART RATE: 72 BPM | SYSTOLIC BLOOD PRESSURE: 104 MMHG | BODY MASS INDEX: 36.66 KG/M2

## 2024-08-19 DIAGNOSIS — Z12.31 ENCOUNTER FOR SCREENING MAMMOGRAM FOR BREAST CANCER: ICD-10-CM

## 2024-08-19 DIAGNOSIS — F41.9 ANXIETY: ICD-10-CM

## 2024-08-19 DIAGNOSIS — E11.9 TYPE 2 DIABETES MELLITUS WITHOUT COMPLICATION, WITHOUT LONG-TERM CURRENT USE OF INSULIN (HCC): Primary | ICD-10-CM

## 2024-08-19 LAB — HBA1C MFR BLD: 5.9 %

## 2024-08-19 PROCEDURE — 3044F HG A1C LEVEL LT 7.0%: CPT | Performed by: NURSE PRACTITIONER

## 2024-08-19 PROCEDURE — 99214 OFFICE O/P EST MOD 30 MIN: CPT | Performed by: NURSE PRACTITIONER

## 2024-08-19 PROCEDURE — 83036 HEMOGLOBIN GLYCOSYLATED A1C: CPT | Performed by: NURSE PRACTITIONER

## 2024-08-19 SDOH — ECONOMIC STABILITY: FOOD INSECURITY: WITHIN THE PAST 12 MONTHS, THE FOOD YOU BOUGHT JUST DIDN'T LAST AND YOU DIDN'T HAVE MONEY TO GET MORE.: NEVER TRUE

## 2024-08-19 SDOH — ECONOMIC STABILITY: FOOD INSECURITY: WITHIN THE PAST 12 MONTHS, YOU WORRIED THAT YOUR FOOD WOULD RUN OUT BEFORE YOU GOT MONEY TO BUY MORE.: NEVER TRUE

## 2024-08-19 SDOH — ECONOMIC STABILITY: INCOME INSECURITY: HOW HARD IS IT FOR YOU TO PAY FOR THE VERY BASICS LIKE FOOD, HOUSING, MEDICAL CARE, AND HEATING?: NOT HARD AT ALL

## 2024-08-19 ASSESSMENT — PATIENT HEALTH QUESTIONNAIRE - PHQ9
SUM OF ALL RESPONSES TO PHQ QUESTIONS 1-9: 0
SUM OF ALL RESPONSES TO PHQ QUESTIONS 1-9: 0
1. LITTLE INTEREST OR PLEASURE IN DOING THINGS: NOT AT ALL
SUM OF ALL RESPONSES TO PHQ9 QUESTIONS 1 & 2: 0
2. FEELING DOWN, DEPRESSED OR HOPELESS: NOT AT ALL
SUM OF ALL RESPONSES TO PHQ QUESTIONS 1-9: 0
SUM OF ALL RESPONSES TO PHQ QUESTIONS 1-9: 0

## 2024-08-19 ASSESSMENT — ENCOUNTER SYMPTOMS
ABDOMINAL DISTENTION: 0
WHEEZING: 0
DIARRHEA: 1
CONSTIPATION: 0
ABDOMINAL PAIN: 1
SHORTNESS OF BREATH: 0
NAUSEA: 0
BLOOD IN STOOL: 0
COUGH: 0
CHEST TIGHTNESS: 0
VOMITING: 0
BACK PAIN: 0

## 2024-08-19 NOTE — PROGRESS NOTES
Visit Information    Have you changed or started any medications since your last visit including any over-the-counter medicines, vitamins, or herbal medicines? yes -    Have you stopped taking any of your medications? Is so, why? -  no  Are you having any side effects from any of your medications? - yes -     Have you seen any other physician or provider since your last visit?  yes -    Have you had any other diagnostic tests since your last visit?  no   Have you been seen in the emergency room and/or had an admission in a hospital since we last saw you?  no   Have you had your routine dental cleaning in the past 6 months?  no     Do you have an active MyChart account? If no, what is the barrier?  Yes    Patient Care Team:  Chris Rudolph APRN - CNP as PCP - General (Nurse Practitioner)  Chris Rudolph APRN - CNP as PCP - Empaneled Provider  Sourav Smith DO as Consulting Physician (Obstetrics & Gynecology)    Medical History Review  Past Medical, Family, and Social History reviewed and does contribute to the patient presenting condition    Health Maintenance   Topic Date Due    Diabetic retinal exam  Never done    Hepatitis B vaccine (1 of 3 - 19+ 3-dose series) Never done    Cervical cancer screen  01/15/2018    Breast cancer screen  10/25/2022    Colorectal Cancer Screen  Never done    Flu vaccine (1) Never done    Pneumococcal 0-64 years Vaccine (1 of 2 - PCV) 09/01/2024 (Originally 8/5/1984)    COVID-19 Vaccine (3 - 2023-24 season) 09/01/2024 (Originally 9/1/2023)    DTaP/Tdap/Td vaccine (1 - Tdap) 01/29/2025 (Originally 8/5/1997)    Lipids  01/26/2025    Diabetic foot exam  01/29/2025    Diabetic Alb to Cr ratio (uACR) test  02/13/2025    GFR test (Diabetes, CKD 3-4, OR last GFR 15-59)  03/25/2025    A1C test (Diabetic or Prediabetic)  04/29/2025    Depression Screen  05/06/2025    Hepatitis C screen  Completed    HIV screen  Completed    Hepatitis A vaccine  Aged Out    Hib vaccine  Aged Out    HPV 
before age 50 & again after age 50    Other Sister         hysterectomy    Diabetes Sister         GDM    Other Sister         Hysterectomy    Other Sister         hysterectomy    Diabetes Sister     Other Sister         hysterectomy    Diabetes Sister     Cancer Neg Hx     Colon Cancer Neg Hx     Eclampsia Neg Hx     Hypertension Neg Hx     Ovarian Cancer Neg Hx      Labor Neg Hx     Spont Abortions Neg Hx     Stroke Neg Hx        Social History     Tobacco Use    Smoking status: Former     Current packs/day: 0.00     Average packs/day: 0.3 packs/day for 1.2 years (0.3 ttl pk-yrs)     Types: Cigarettes     Quit date: 2023     Years since quittin.7     Passive exposure: Past    Smokeless tobacco: Never    Tobacco comments:     quit   Vaping Use    Vaping status: Never Used   Substance Use Topics    Alcohol use: Yes     Comment: Occassional    Drug use: No         SUBJECTIVE/OBJECTIVE:    Patient is here today for follow-up.  Only concern she has today is her FMLA.  She tells me that her job has been giving her hard time for using her FMLA.  She states she was not feeling well back at the end of July, and had to call off.  No visit was done, no e-visit was done, so therefore I did not see the patient and therefore I am unable to write her a note.  I did explain this to her and she did state understanding.  In regards to the FMLA, she states is not just for anxiety, is actually for abdominal pain and cramping and diarrhea that she will get when she does not take her medication at the same time every day.  She states sometimes she works a swing shift, more often on Friday and Saturday she will work in the evening, which she states throws off her schedule for her medication regimen, and she in turn will experience side effects from the medication.  I did inform her that I am willing to fill out accommodation paperwork for her if this would be helpful, can renew her FMLA it updated if she would like.  It

## 2024-09-09 ENCOUNTER — TELEPHONE (OUTPATIENT)
Age: 44
End: 2024-09-09

## 2024-09-09 ENCOUNTER — PHARMACY VISIT (OUTPATIENT)
Age: 44
End: 2024-09-09
Payer: COMMERCIAL

## 2024-09-09 VITALS
SYSTOLIC BLOOD PRESSURE: 112 MMHG | OXYGEN SATURATION: 97 % | DIASTOLIC BLOOD PRESSURE: 76 MMHG | HEART RATE: 73 BPM | BODY MASS INDEX: 36.69 KG/M2 | WEIGHT: 200.6 LBS

## 2024-09-09 DIAGNOSIS — E11.65 TYPE 2 DIABETES MELLITUS WITH HYPERGLYCEMIA, WITHOUT LONG-TERM CURRENT USE OF INSULIN (HCC): Primary | ICD-10-CM

## 2024-09-09 PROCEDURE — 99213 OFFICE O/P EST LOW 20 MIN: CPT

## 2024-09-09 RX ORDER — LIRAGLUTIDE 6 MG/ML
INJECTION SUBCUTANEOUS
Qty: 3 ADJUSTABLE DOSE PRE-FILLED PEN SYRINGE | Refills: 3 | Status: SHIPPED | OUTPATIENT
Start: 2024-09-09

## 2024-09-23 ENCOUNTER — HOSPITAL ENCOUNTER (OUTPATIENT)
Dept: WOMENS IMAGING | Age: 44
Discharge: HOME | End: 2024-09-25
Payer: COMMERCIAL

## 2024-09-23 VITALS — WEIGHT: 193 LBS | BODY MASS INDEX: 35.51 KG/M2 | HEIGHT: 62 IN

## 2024-09-23 DIAGNOSIS — Z12.31 ENCOUNTER FOR SCREENING MAMMOGRAM FOR BREAST CANCER: ICD-10-CM

## 2024-09-23 PROCEDURE — 77063 BREAST TOMOSYNTHESIS BI: CPT

## 2024-10-07 ENCOUNTER — TELEPHONE (OUTPATIENT)
Age: 44
End: 2024-10-07

## 2024-10-07 ENCOUNTER — HOSPITAL ENCOUNTER (OUTPATIENT)
Age: 44
Setting detail: SPECIMEN
Discharge: HOME | End: 2024-10-07
Payer: COMMERCIAL

## 2024-10-07 ENCOUNTER — PHARMACY VISIT (OUTPATIENT)
Age: 44
End: 2024-10-07
Payer: COMMERCIAL

## 2024-10-07 ENCOUNTER — TELEMEDICINE (OUTPATIENT)
Dept: FAMILY MEDICINE CLINIC | Age: 44
End: 2024-10-07
Payer: COMMERCIAL

## 2024-10-07 VITALS
BODY MASS INDEX: 37.22 KG/M2 | HEART RATE: 80 BPM | OXYGEN SATURATION: 99 % | WEIGHT: 203.5 LBS | SYSTOLIC BLOOD PRESSURE: 132 MMHG | DIASTOLIC BLOOD PRESSURE: 85 MMHG

## 2024-10-07 DIAGNOSIS — E11.9 TYPE 2 DIABETES MELLITUS WITHOUT COMPLICATION, WITHOUT LONG-TERM CURRENT USE OF INSULIN (HCC): ICD-10-CM

## 2024-10-07 DIAGNOSIS — E11.9 TYPE 2 DIABETES MELLITUS WITHOUT COMPLICATION, WITHOUT LONG-TERM CURRENT USE OF INSULIN (HCC): Primary | ICD-10-CM

## 2024-10-07 DIAGNOSIS — E78.2 MIXED HYPERLIPIDEMIA: ICD-10-CM

## 2024-10-07 DIAGNOSIS — E55.9 VITAMIN D DEFICIENCY: ICD-10-CM

## 2024-10-07 DIAGNOSIS — E11.65 TYPE 2 DIABETES MELLITUS WITH HYPERGLYCEMIA, WITHOUT LONG-TERM CURRENT USE OF INSULIN (HCC): Primary | ICD-10-CM

## 2024-10-07 DIAGNOSIS — R53.83 OTHER FATIGUE: Primary | ICD-10-CM

## 2024-10-07 LAB
ANION GAP SERPL CALCULATED.3IONS-SCNC: 11 MMOL/L (ref 9–17)
BUN SERPL-MCNC: 15 MG/DL (ref 6–20)
CALCIUM SERPL-MCNC: 9.1 MG/DL (ref 8.6–10.4)
CHLORIDE SERPL-SCNC: 101 MMOL/L (ref 98–107)
CO2 SERPL-SCNC: 24 MMOL/L (ref 20–31)
CREAT SERPL-MCNC: 0.5 MG/DL (ref 0.5–0.9)
GFR, ESTIMATED: >90 ML/MIN/1.73M2
GLUCOSE SERPL-MCNC: 199 MG/DL (ref 70–99)
POTASSIUM SERPL-SCNC: 4.3 MMOL/L (ref 3.7–5.3)
SODIUM SERPL-SCNC: 136 MMOL/L (ref 135–144)

## 2024-10-07 PROCEDURE — 99214 OFFICE O/P EST MOD 30 MIN: CPT | Performed by: NURSE PRACTITIONER

## 2024-10-07 PROCEDURE — 99213 OFFICE O/P EST LOW 20 MIN: CPT

## 2024-10-07 PROCEDURE — 36415 COLL VENOUS BLD VENIPUNCTURE: CPT

## 2024-10-07 PROCEDURE — 3044F HG A1C LEVEL LT 7.0%: CPT | Performed by: NURSE PRACTITIONER

## 2024-10-07 PROCEDURE — 80048 BASIC METABOLIC PNL TOTAL CA: CPT

## 2024-10-07 ASSESSMENT — ENCOUNTER SYMPTOMS
NAUSEA: 0
ABDOMINAL DISTENTION: 0
ABDOMINAL PAIN: 0
VOMITING: 0
WHEEZING: 0
BLOOD IN STOOL: 0
CONSTIPATION: 0
DIARRHEA: 0
SHORTNESS OF BREATH: 0
COUGH: 0
BACK PAIN: 0
CHEST TIGHTNESS: 0

## 2024-10-07 NOTE — RESULT ENCOUNTER NOTE
Please notify patient that glucose is pretty high at 199, I am unsure if she was fasting for this.  I will discuss this with her later at her visit.

## 2024-10-07 NOTE — PROGRESS NOTES
10/7/2024    TELEHEALTH EVALUATION -- Audio/Visual       Debbie Keenan (:  1980) is a 44 y.o. female,Established patient, here for evaluation of the following chief complaint(s): Fatigue    Patient is here today to discuss some fatigue has been experiencing off-and-on for the last few weeks to months.  She tells me that with the weight loss, she has noticed that she is just tired.  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  She states that really takes a toll on her, takes a toll on when she takes her medication.  She states it has really been messing with her mental health as well 2.  She states that she is unsure if the fatigue is related to work, so she would like to adjust her hours at work is needing updated FMLA.  She is going to drop this off to the office so we can update it for her.  I also can order some additional lab work to be completed included autoimmune workup which she does agree with.  She has no other major concerns at this time.  States that her weight loss journey is still going well, she has really been trying to change her eating habits as well as increasing her exercise as tolerated.  Will await for paperwork to be sent over, and we will make sure we fill this out for her accordingly.  No other concerns today, plan for 4-month follow-up.  Continue to follow with medication management and other specialist.    ASSESSMENT/PLAN:    1. Other fatigue  -Slightly worsening over the last few months  -Plan to update her FMLA  -Plan to order some additional blood work  -See note above  -     Iron and TIBC; Future  -     Ferritin; Future  -     Sedimentation Rate; Future  -     SHIV Screen with Reflex; Future  -     C-Reactive Protein; Future  -     Rheumatoid Factor; Future  -     CBC; Future  -     Comprehensive Metabolic Panel; Future  -     Vitamin D 25 Hydroxy;

## 2024-10-07 NOTE — PROGRESS NOTES
appointment - unable to assess  Pt reports that her BG is staying about 100-145 for fasting readings.     Blood glucose trends noted: Not applicable    ASSESSMENT     Recent A1c: Yes 6.     Lab Results   Component Value Date    LABA1C 5.9 08/19/2024    LABA1C 6.0 04/29/2024    LABA1C 9.4 (H) 01/18/2024        Eating patterns: Cut out all regular pop. Occasional diet pop. Every once in awhile a piece of candy  Breakfast- protein shake or coffee with protein drink in it.  Sausage with cheese.   Lunch- Sometimes skips lunch. Trying to meal prep so that she has healthy easy to grab meals for lunch at work.   Evening- Largest meal. Meat with vegetables; chicken with vegetables.     Has been eating for carb rich comfort foods recently due to to stress.     Exercise patterns: planning to start going to Gym again soon      Blood Glucose Testing:  Glucometer ; Checking once in the morning and occasionally adding second reading    Current Diabetic Medications:    Metformin XR 1000 mg twice daily  Jardiance 10 mg daily  Victoza 1.8 mg daily       Diabetic Regimen/Compliance: Not using Victoza due to back orders. Will change pt to Ozempic.      Other Medication Compliance: yes    Refills needed (diabetes medications/testing supplies). Yes: unable to use Victoza due to backorder    Labs needed (A1c, lipids, microalbumin, SCr etc) Yes: BMP    Up to date with eye/foot exams no: Patient needs to schedule    Patient is on appropriate statin, ACE/ARB, and ASA Yes Lipitor 10 mg daily  Urine Micro-Albumen less 12    PLAN   Diabetic Action Plan is shown below. Patient and provider worked together to create goals which patient will work toward prior to the next appointment.    Schedule updated eye and foot exam   Continue Metformin XR 1000 mg twice daily.    Increase  Jardiance 25 mg daily   Start Ozempic 0.25 mg Weekly. Will send  prescription to [facility] pharmacy.   Continue to check blood sugars once in the morning and then once

## 2024-10-07 NOTE — TELEPHONE ENCOUNTER
New Rx for jardiance 25 mg daily #30 R5; Ozempic 0.25 mg R1 to [facility].     Katie PaulD, BCPS 10/7/2024 8:12 AM

## 2024-10-07 NOTE — PATIENT INSTRUCTIONS
Schedule updated eye and foot exam   Continue Metformin XR 1000 mg twice daily.    Increase  Jardiance 25 mg daily   Start Ozempic 0.25 mg Weekly. Will send  prescription to [facility]'s pharmacy.   Continue to check blood sugars once in the morning and then once after a meal (dinner or lunch) about 2 hours a few times per week  Call if BG <70 [phone]

## 2024-10-09 ENCOUNTER — TELEPHONE (OUTPATIENT)
Age: 44
End: 2024-10-09

## 2024-10-09 NOTE — TELEPHONE ENCOUNTER
Called [facility] to inform them PA for Ozempic was approved. They ran RX thru and indicated it did get approved.  Called and left patient a message to  and start Ozempic as instructed at recent appt.   Olman Hester RPH,Pharm.D,, BCPS, CACP  10/9/2024  5:42 PM

## 2024-10-10 ENCOUNTER — TELEPHONE (OUTPATIENT)
Dept: FAMILY MEDICINE CLINIC | Age: 44
End: 2024-10-10

## 2024-10-10 NOTE — TELEPHONE ENCOUNTER
PATIENT CAME IN THE OFFICE TO  HER FMLA AND STATES SHE CAN ONLY WORK BETWEEN 5AM - 5PM FOR 6 DAYS ONLY AND IT IS NOT ON HER. PLEASE ADVISE WILL NEED TO RE- DO THE FORMS ? UNDER THE REDUCED PORTION

## 2024-10-28 ENCOUNTER — TELEPHONE (OUTPATIENT)
Dept: FAMILY MEDICINE CLINIC | Age: 44
End: 2024-10-28

## 2024-10-28 ENCOUNTER — OFFICE VISIT (OUTPATIENT)
Dept: FAMILY MEDICINE CLINIC | Age: 44
End: 2024-10-28
Payer: COMMERCIAL

## 2024-10-28 VITALS
OXYGEN SATURATION: 98 % | SYSTOLIC BLOOD PRESSURE: 126 MMHG | TEMPERATURE: 97.4 F | HEART RATE: 96 BPM | WEIGHT: 202 LBS | BODY MASS INDEX: 36.95 KG/M2 | DIASTOLIC BLOOD PRESSURE: 87 MMHG

## 2024-10-28 DIAGNOSIS — L73.9 NASAL FOLLICULITIS: Primary | ICD-10-CM

## 2024-10-28 PROCEDURE — 99213 OFFICE O/P EST LOW 20 MIN: CPT

## 2024-10-28 RX ORDER — MUPIROCIN 20 MG/G
OINTMENT TOPICAL
Qty: 15 G | Refills: 0 | Status: SHIPPED | OUTPATIENT
Start: 2024-10-28

## 2024-10-28 RX ORDER — CEPHALEXIN 500 MG/1
500 CAPSULE ORAL 3 TIMES DAILY
Qty: 21 CAPSULE | Refills: 0 | Status: SHIPPED | OUTPATIENT
Start: 2024-10-28 | End: 2024-11-04

## 2024-10-28 ASSESSMENT — ENCOUNTER SYMPTOMS
ROS SKIN COMMENTS: + MASS
RHINORRHEA: 0

## 2024-10-28 NOTE — PATIENT INSTRUCTIONS
Finish the entire course of antibiotic treatment.  Apply antibiotic ointment for one week.  Follow-up as needed.

## 2024-10-28 NOTE — TELEPHONE ENCOUNTER
Patient called in wanting her excuse work note change. Provider that seen patient stated if she needs to be off work longer needs to talk to primary care provider. Lvm with patient to contact office.  
soft

## 2024-10-28 NOTE — PROGRESS NOTES
[facility]  [address] [facility] [facility] [address]    [facility]  [address]  [address]  [facility] [address]  Dept: [phone]    Debbie Keenan is a 44 y.o. female Established patient, who presents to the walk-in clinic today with conditions/complaints as noted below:    Chief Complaint   Patient presents with    Other     Bump inside of her nose, noticed it Saturday          HPI:     Patient is a 44-year-old female that presents today for evaluation of a bump inside her right nostril. States that she noticed it on Saturday; notes that it's progressively worsened/gotten bigger. Endorses associated tenderness. Denies bleeding or drainage. No fevers.        Past Medical History:   Diagnosis Date    Acid reflux     Anxiety 2/3/2022    Bruises easily     Insulin resistance     Mixed hyperlipidemia 11/15/2021    Obesity, Class III, BMI 40-49.9 (morbid obesity) 7/28/2015    Type 2 diabetes mellitus with hyperglycemia 1/29/2024    Vision abnormalities        Current Outpatient Medications   Medication Sig Dispense Refill    mupirocin (BACTROBAN) 2 % ointment Apply topically 3 times daily for 7 days. 15 g 0    cephALEXin (KEFLEX) 500 MG capsule Take 1 capsule by mouth 3 times daily for 7 days 21 capsule 0    empagliflozin (JARDIANCE) 25 MG tablet Take 1 tablet by mouth daily 30 tablet 5    Semaglutide,0.25 or 0.5MG/DOS, 2 MG/1.5ML SOPN Inject 0.25 mg into the skin Once a week at 5 PM 1.5 mL 1    vitamin D (ERGOCALCIFEROL) 1.25 MG (13559 UT) CAPS capsule TAKE 1 CAPSULE BY MOUTH ONCE WEEKLY 12 capsule 1    atorvastatin (LIPITOR) 10 MG tablet TAKE 1 TABLET BY MOUTH DAILY 90 tablet 0    metFORMIN (GLUCOPHAGE-XR) 500 MG extended release tablet Take 2 tablets by mouth 2 times daily (with meals) 360 tablet 1    JARDIANCE 10 MG tablet TAKE 1

## 2024-11-04 ENCOUNTER — PHARMACY VISIT (OUTPATIENT)
Age: 44
End: 2024-11-04
Payer: COMMERCIAL

## 2024-11-04 ENCOUNTER — OFFICE VISIT (OUTPATIENT)
Dept: FAMILY MEDICINE CLINIC | Age: 44
End: 2024-11-04
Payer: COMMERCIAL

## 2024-11-04 VITALS
HEART RATE: 88 BPM | DIASTOLIC BLOOD PRESSURE: 74 MMHG | BODY MASS INDEX: 37.17 KG/M2 | SYSTOLIC BLOOD PRESSURE: 122 MMHG | HEIGHT: 62 IN | WEIGHT: 202 LBS | TEMPERATURE: 97.6 F | OXYGEN SATURATION: 97 %

## 2024-11-04 VITALS
WEIGHT: 201.3 LBS | DIASTOLIC BLOOD PRESSURE: 77 MMHG | BODY MASS INDEX: 36.82 KG/M2 | SYSTOLIC BLOOD PRESSURE: 109 MMHG | HEART RATE: 88 BPM | OXYGEN SATURATION: 98 %

## 2024-11-04 DIAGNOSIS — F41.9 ANXIETY: ICD-10-CM

## 2024-11-04 DIAGNOSIS — E11.9 TYPE 2 DIABETES MELLITUS WITHOUT COMPLICATION, WITHOUT LONG-TERM CURRENT USE OF INSULIN (HCC): Primary | ICD-10-CM

## 2024-11-04 DIAGNOSIS — R53.83 OTHER FATIGUE: ICD-10-CM

## 2024-11-04 PROCEDURE — 3044F HG A1C LEVEL LT 7.0%: CPT | Performed by: NURSE PRACTITIONER

## 2024-11-04 PROCEDURE — 99214 OFFICE O/P EST MOD 30 MIN: CPT | Performed by: NURSE PRACTITIONER

## 2024-11-04 PROCEDURE — 99213 OFFICE O/P EST LOW 20 MIN: CPT | Performed by: PHARMACY

## 2024-11-04 RX ORDER — ACYCLOVIR 400 MG/1
TABLET ORAL
Qty: 1 EACH | Refills: 1 | Status: SHIPPED | OUTPATIENT
Start: 2024-11-04

## 2024-11-04 RX ORDER — ACYCLOVIR 400 MG/1
TABLET ORAL
Qty: 3 EACH | Refills: 3 | Status: SHIPPED | OUTPATIENT
Start: 2024-11-04

## 2024-11-04 RX ORDER — ERGOCALCIFEROL 1.25 MG/1
50000 CAPSULE, LIQUID FILLED ORAL WEEKLY
Qty: 12 CAPSULE | Refills: 1 | Status: CANCELLED | OUTPATIENT
Start: 2024-11-04

## 2024-11-04 RX ORDER — ATORVASTATIN CALCIUM 10 MG/1
10 TABLET, FILM COATED ORAL DAILY
Qty: 90 TABLET | Refills: 0 | Status: SHIPPED | OUTPATIENT
Start: 2024-11-04 | End: 2024-11-09

## 2024-11-04 RX ORDER — SEMAGLUTIDE 0.68 MG/ML
INJECTION, SOLUTION SUBCUTANEOUS
COMMUNITY
Start: 2024-10-09

## 2024-11-04 SDOH — ECONOMIC STABILITY: FOOD INSECURITY: WITHIN THE PAST 12 MONTHS, YOU WORRIED THAT YOUR FOOD WOULD RUN OUT BEFORE YOU GOT MONEY TO BUY MORE.: NEVER TRUE

## 2024-11-04 SDOH — ECONOMIC STABILITY: FOOD INSECURITY: WITHIN THE PAST 12 MONTHS, THE FOOD YOU BOUGHT JUST DIDN'T LAST AND YOU DIDN'T HAVE MONEY TO GET MORE.: NEVER TRUE

## 2024-11-04 SDOH — ECONOMIC STABILITY: INCOME INSECURITY: HOW HARD IS IT FOR YOU TO PAY FOR THE VERY BASICS LIKE FOOD, HOUSING, MEDICAL CARE, AND HEATING?: NOT VERY HARD

## 2024-11-04 ASSESSMENT — ENCOUNTER SYMPTOMS
NAUSEA: 0
ABDOMINAL DISTENTION: 0
BLOOD IN STOOL: 0
SHORTNESS OF BREATH: 0
WHEEZING: 0
VOMITING: 0
BACK PAIN: 0
DIARRHEA: 0
COUGH: 0
CONSTIPATION: 0
ABDOMINAL PAIN: 0
CHEST TIGHTNESS: 0

## 2024-11-04 ASSESSMENT — PATIENT HEALTH QUESTIONNAIRE - PHQ9
SUM OF ALL RESPONSES TO PHQ QUESTIONS 1-9: 0
SUM OF ALL RESPONSES TO PHQ9 QUESTIONS 1 & 2: 0
2. FEELING DOWN, DEPRESSED OR HOPELESS: NOT AT ALL
1. LITTLE INTEREST OR PLEASURE IN DOING THINGS: NOT AT ALL
SUM OF ALL RESPONSES TO PHQ QUESTIONS 1-9: 0

## 2024-11-04 NOTE — PROGRESS NOTES
Clinic will send a prescription to [facility] for Dexcom.     Physician Follow-up:  As scheduled    Medication Management Follow-up:   Diabetes Service 4 weeks due to reduced control of blood sugars.      For Pharmacy Admin Tracking Only    Program: Medication Management  CPA in place:  Yes  Recommendation Provided To: Patient/Caregiver: 3 via In person  Intervention Detail: Adherence Monitorin, Dose Adjustment: 1, reason: Therapy Optimization, and New Rx: 1, reason: Patient Preference  Intervention Accepted By: Patient/Caregiver: 3  Time Spent (min): 30     Electronically signed by Luisana Casey RPH on 2024 at 7:30 AM

## 2024-11-04 NOTE — PATIENT INSTRUCTIONS
Schedule updated eye and foot exam.  Continue Metformin XR 1000 mg twice daily.    Continue Jardiance 25 mg daily.   Start Ozempic 0.5 mg Weekly.   Continue to check blood sugars once in the morning and then once after a meal (dinner or lunch) about 2 hours a few times per week  Call if BG <70 [phone], treat as discussed  7.   Clinic will send a prescription to [facility] for Dexcom.

## 2024-11-04 NOTE — PROGRESS NOTES
Visit Information    Have you changed or started any medications since your last visit including any over-the-counter medicines, vitamins, or herbal medicines? YES  Have you stopped taking any of your medications? Is so, why? -  no  Are you having any side effects from any of your medications? - no    Have you seen any other physician or provider since your last visit?  YES   Have you had any other diagnostic tests since your last visit?  no   Have you been seen in the emergency room and/or had an admission in a hospital since we last saw you?  no   Have you had your routine dental cleaning in the past 6 months?  no     Do you have an active MyChart account? If no, what is the barrier?  Yes     “Have you had a pap smear?”    NO    Date of last Cervical Cancer screen (HPV or PAP): 1/15/2015       “Have you had a colorectal cancer screening such as a colonoscopy/FIT/Cologuard?    NO    No colonoscopy on file  No cologuard on file  No FIT/FOBT on file   No flexible sigmoidoscopy on file     “Have you had a diabetic eye exam?”    NO     No diabetic eye exam on file     Patient Care Team:  Chris Rudolph APRN - CNP as PCP - General (Nurse Practitioner)  Chris Rudolph APRN - CNP as PCP - Empaneled Provider  Sourav Smith DO as Consulting Physician (Obstetrics & Gynecology)    Medical History Review  Past Medical, Family, and Social History reviewed and does contribute to the patient presenting condition    Health Maintenance   Topic Date Due    Pneumococcal 0-64 years Vaccine (1 of 2 - PCV) Never done    Diabetic retinal exam  Never done    Hepatitis B vaccine (1 of 3 - 19+ 3-dose series) Never done    Cervical cancer screen  01/15/2018    Colorectal Cancer Screen  Never done    Flu vaccine (1) Never done    DTaP/Tdap/Td vaccine (1 - Tdap) 01/29/2025 (Originally 8/5/1997)    COVID-19 Vaccine (3 - 2023-24 season) 10/07/2025 (Originally 9/1/2024)    Lipids  01/26/2025    Diabetic foot exam  01/29/2025    Diabetic

## 2024-11-04 NOTE — PROGRESS NOTES
Debbie Keenan (:  1980) is a 44 y.o. female,Established patient, here for evaluation of the following chief complaint(s): Anxiety (FMLA/HR DEPARTMENT APPROVED/STILL GETTING SCHEDULED HOURS) and Diabetes (FMLA/DENIED )      ASSESSMENT/PLAN:    Debbie received counseling on the following healthy behaviors: nutrition, exercise, and medication adherence  Reviewed prior labs and health maintenance  Discussed use, benefit, and side effects of prescribed medications.   Barriers to medication compliance addressed.   Patient given educational materials - see patient instructions  All patient questions answered.  Patient voiced understanding.  The patient's past medical,surgical, social, and family history as well as her current medications and allergies were reviewed as documented in today's encounter.    Medications, labs, diagnostic studies, consultations and follow-up as documented in this encounter.    Debbie was seen today for anxiety and diabetes.    Diagnoses and all orders for this visit:    Type 2 diabetes mellitus without complication, without long-term current use of insulin ([facility])  Lab Results   Component Value Date    LABA1C 5.9 2024    LABA1C 6.0 2024    LABA1C 9.4 (H) 2024   -Controlled according to recent blood work  -Does need to complete blood work ordered  -Well-managed with current regimen  -     atorvastatin (LIPITOR) 10 MG tablet; Take 1 tablet by mouth daily    Anxiety  -Better controlled when her work regimen is more consistent  -No change in plan of care for now  -Additional FMLA filled out    Other fatigue  -Better controlled when her work regimen is more consistent  -No change in plan of care for now  -Additional FMLA filled out  -Please complete testing as ordered    Prior to Visit Medications    Medication Sig Taking? Authorizing Provider   OZEMPIC, 0.25 OR 0.5 MG/DOSE, 2 MG/3ML SOPN  Yes Provider, MD Carl[place]   atorvastatin (LIPITOR) 10 MG tablet

## 2024-11-08 DIAGNOSIS — E88.819 INSULIN RESISTANCE: ICD-10-CM

## 2024-11-08 DIAGNOSIS — E11.9 TYPE 2 DIABETES MELLITUS WITHOUT COMPLICATION, WITHOUT LONG-TERM CURRENT USE OF INSULIN (HCC): ICD-10-CM

## 2024-11-08 NOTE — TELEPHONE ENCOUNTER
Please Approve or Refuse.  Send to Pharmacy per Pt's Request: vidya     Next Visit Date:  11/25/2024   Last Visit Date: 11/4/2024    Hemoglobin A1C (%)   Date Value   08/19/2024 5.9   04/29/2024 6.0   01/18/2024 9.4 (H)             ( goal A1C is < 7)   BP Readings from Last 3 Encounters:   11/04/24 122/74   11/04/24 109/77   10/28/24 126/87          (goal 120/80)  BUN   Date Value Ref Range Status   10/07/2024 15 6 - 20 mg/dL Final     Creatinine   Date Value Ref Range Status   10/07/2024 0.5 0.5 - 0.9 mg/dL Final     Potassium   Date Value Ref Range Status   10/07/2024 4.3 3.7 - 5.3 mmol/L Final

## 2024-11-09 RX ORDER — ATORVASTATIN CALCIUM 10 MG/1
10 TABLET, FILM COATED ORAL DAILY
Qty: 90 TABLET | Refills: 0 | Status: SHIPPED | OUTPATIENT
Start: 2024-11-09

## 2024-11-11 RX ORDER — EMPAGLIFLOZIN 10 MG/1
10 TABLET, FILM COATED ORAL DAILY
Qty: 30 TABLET | Refills: 3 | OUTPATIENT
Start: 2024-11-11

## 2024-11-12 ENCOUNTER — TELEPHONE (OUTPATIENT)
Age: 44
End: 2024-11-12

## 2024-11-12 NOTE — TELEPHONE ENCOUNTER
Called pt to notify that PA for dexcom g7 was approved from 10/12/2024 though 11/11/2025.   From dispense Hx looks like pt already picked up dexcom g 7  and sensors on 11/4/2024.   Left pt a voicemail to call back if any questions.     Katie PaulD, BCPS 11/12/2024 1:28 PM

## 2024-11-22 ENCOUNTER — TELEPHONE (OUTPATIENT)
Age: 44
End: 2024-11-22

## 2024-11-22 ENCOUNTER — HOSPITAL ENCOUNTER (OUTPATIENT)
Age: 44
Setting detail: SPECIMEN
Discharge: HOME | End: 2024-11-22

## 2024-11-22 NOTE — TELEPHONE ENCOUNTER
Called patient due to them not showing up for their appt today in the [place] [facility] for Diabetes medication management.  Left message for them to call back to reschedule their appt.     Luisana Casey [facility], PharmD, BCACP  11/22/2024  3:45 PM

## 2024-11-25 ENCOUNTER — OFFICE VISIT (OUTPATIENT)
Dept: FAMILY MEDICINE CLINIC | Age: 44
End: 2024-11-25

## 2024-11-25 VITALS — BODY MASS INDEX: 36.51 KG/M2 | WEIGHT: 199.6 LBS | HEART RATE: 93 BPM | OXYGEN SATURATION: 98 %

## 2024-11-25 DIAGNOSIS — F41.9 ANXIETY: ICD-10-CM

## 2024-11-25 DIAGNOSIS — E11.9 TYPE 2 DIABETES MELLITUS WITHOUT COMPLICATION, WITHOUT LONG-TERM CURRENT USE OF INSULIN (HCC): Primary | ICD-10-CM

## 2024-11-25 DIAGNOSIS — E55.9 VITAMIN D DEFICIENCY: ICD-10-CM

## 2024-11-25 LAB — HBA1C MFR BLD: 6.3 %

## 2024-11-25 RX ORDER — SEMAGLUTIDE 0.68 MG/ML
INJECTION, SOLUTION SUBCUTANEOUS
Status: CANCELLED | OUTPATIENT
Start: 2024-11-25

## 2024-11-25 RX ORDER — ERGOCALCIFEROL 1.25 MG/1
50000 CAPSULE, LIQUID FILLED ORAL WEEKLY
Qty: 12 CAPSULE | Refills: 1 | Status: SHIPPED | OUTPATIENT
Start: 2024-11-25

## 2024-11-25 SDOH — ECONOMIC STABILITY: FOOD INSECURITY: WITHIN THE PAST 12 MONTHS, THE FOOD YOU BOUGHT JUST DIDN'T LAST AND YOU DIDN'T HAVE MONEY TO GET MORE.: NEVER TRUE

## 2024-11-25 SDOH — ECONOMIC STABILITY: FOOD INSECURITY: WITHIN THE PAST 12 MONTHS, YOU WORRIED THAT YOUR FOOD WOULD RUN OUT BEFORE YOU GOT MONEY TO BUY MORE.: NEVER TRUE

## 2024-11-25 SDOH — ECONOMIC STABILITY: INCOME INSECURITY: HOW HARD IS IT FOR YOU TO PAY FOR THE VERY BASICS LIKE FOOD, HOUSING, MEDICAL CARE, AND HEATING?: NOT VERY HARD

## 2024-11-25 ASSESSMENT — ENCOUNTER SYMPTOMS
ABDOMINAL DISTENTION: 0
WHEEZING: 0
DIARRHEA: 0
NAUSEA: 0
SHORTNESS OF BREATH: 0
CHEST TIGHTNESS: 0
COUGH: 0
CONSTIPATION: 0
BLOOD IN STOOL: 0
BACK PAIN: 0
VOMITING: 0
ABDOMINAL PAIN: 0

## 2024-11-25 ASSESSMENT — PATIENT HEALTH QUESTIONNAIRE - PHQ9
2. FEELING DOWN, DEPRESSED OR HOPELESS: SEVERAL DAYS
SUM OF ALL RESPONSES TO PHQ QUESTIONS 1-9: 2
1. LITTLE INTEREST OR PLEASURE IN DOING THINGS: SEVERAL DAYS
SUM OF ALL RESPONSES TO PHQ9 QUESTIONS 1 & 2: 2
SUM OF ALL RESPONSES TO PHQ QUESTIONS 1-9: 2

## 2024-11-25 NOTE — PROGRESS NOTES
Debbie Keenan (:  1980) is a 44 y.o. female,Established patient, here for evaluation of the following chief complaint(s): Diabetes, Discuss Labs (DIDN'T GET DONE), and Immunizations (NO TO ALL VACCINES DUE)      ASSESSMENT/PLAN:    Debbie received counseling on the following healthy behaviors: nutrition, exercise, and medication adherence  Reviewed prior labs and health maintenance  Discussed use, benefit, and side effects of prescribed medications.   Barriers to medication compliance addressed.   Patient given educational materials - see patient instructions  All patient questions answered.  Patient voiced understanding.  The patient's past medical,surgical, social, and family history as well as her current medications and allergies were reviewed as documented in today's encounter.    Medications, labs, diagnostic studies, consultations and follow-up as documented in this encounter.    Debbie was seen today for diabetes, discuss labs and immunizations.    Diagnoses and all orders for this visit:    Type 2 diabetes mellitus without complication, without long-term current use of insulin (HCC)  -Slightly worsening  -Lifestyle modifications suggested  -Continue current regimen  -Continue monitoring blood sugar at home, A1c check in 3 months  -     POCT glycosylated hemoglobin (Hb A1C)  -     Semaglutide,0.25 or 0.5MG/DOS, 2 MG/1.5ML SOPN; Inject 0.5 mg into the skin Once a week at 5 PM    Vitamin D deficiency  -Unsure if improved or not  -Medication refilled for now  -Educated to complete blood work as ordered  -     vitamin D (ERGOCALCIFEROL) 1.25 MG (50903 UT) CAPS capsule; Take 1 capsule by mouth Once a week at 5 PM    Anxiety  -Worsening as of recently  -Plan for referral to behavioral health for counseling  -See note below  -     [facility] ([facility])    Prior to Visit Medications    Medication Sig Taking? Authorizing Provider   vitamin D (ERGOCALCIFEROL) 
Aged Out    Meningococcal (ACWY) vaccine  Aged Out

## 2025-01-07 ENCOUNTER — TELEPHONE (OUTPATIENT)
Dept: FAMILY MEDICINE CLINIC | Age: 45
End: 2025-01-07

## 2025-01-07 NOTE — TELEPHONE ENCOUNTER
Pt requesting if her [facility] papers could be updated with today's date if no changes were made since October her employment is asking for new papers for 2025

## 2025-01-07 NOTE — TELEPHONE ENCOUNTER
"  Chief Complaint   Patient presents with   • Annual Exam       Subjective   Delilah Morse is a 56 y.o. female and is here for a yearly physical exam. The patient reports problems - She is now seeing psychiatry.  Her Wellbutrin has been stopped.  Still smoking half pack a day.  She is now on Cymbalta and Vyvanse.  She was diagnosed with ADD many years ago.  There was on medication.  Found herself not able to focus at all.  Doing much better with the medication.  Improved performance at work.  Also sleeping better.  Anxiety is better.  She is now off the gabapentin as it was really not helping her intrathoracic chest pain.  Was also causing brain fog.    Do you take any herbs or supplements that were not prescribed by a doctor? yes. If so, these will be added to active medication list.    The following portions of the patient's history were reviewed and updated as appropriate: allergies, current medications, past family history, past medical history, past social history, past surgical history and problem list.    Social and Family and Surgical History reviewed and updated today, see Rooming tab.    Health History, Preventive Measures and Vaccination flow sheets reviewed and updated today.    Patient's current medical chart in Epic; including previous office notes, Mychart messages, recent phone calls, imaging, labs, specialist's evaluation either in notes or in Media tab reviewed today.    Other outside pertinent medical information also reviewed thru Care Everywhere function is also reviewed today.    Review of Systems  Review of Systems  Pertinent items are noted in HPI.    Vitals:    11/01/21 1503   BP: 138/80   BP Location: Left arm   Patient Position: Sitting   Cuff Size: Adult   Pulse: 118   Temp: 97.3 °F (36.3 °C)   TempSrc: Temporal   SpO2: 98%   Weight: 73.5 kg (162 lb)   Height: 167.6 cm (66\")     Body mass index is 26.15 kg/m².      General Appearance:  Alert, cooperative, no distress, appears " To update FMLA yes, I will discuss with her on 1/10. Thanks    stated age   Head:  Normocephalic, without obvious abnormality, atraumatic   Eyes:  PERRL, conjunctiva/corneas clear, EOM's intact.   Ears:  Normal TM's and external ear canals, both ears   Nose: Nares normal, septum midline, mucosa normal, no drainage or sinus tenderness   Throat: Lips, mucosa, and tongue normal; teeth and gums normal   Neck: Supple, symmetrical, trachea midline, no adenopathy;   thyroid: No enlargement/tenderness/nodules; no carotid  bruit   Back:  Symmetric, no curvature, ROM normal, no CVA tenderness   Lungs:  Clear to auscultation bilaterally, respirations unlabored   Chest wall:  No tenderness or deformity   Heart:   Elevated rate and rhythm, S1 and S2 normal, no murmur, rub or gallop   Abdomen:  Soft, non-tender, bowel sounds active all four quadrants,   no masses, no organomegaly   Rectal:        Extremities: Extremities normal, atraumatic, no cyanosis or edema   Pulses: 2+ and symmetric all extremities   Skin: Skin color, texture, turgor normal, no rashes or lesions   Lymph nodes: Cervical, supraclavicular, and axillary nodes normal   Neurologic: CNII-XII intact. Normal strength, sensation and reflexes   throughout          Results for orders placed or performed in visit on 10/14/21   TSH    Specimen: Blood   Result Value Ref Range    TSH 1.270 0.450 - 4.500 uIU/mL   CBC (No Diff)    Specimen: Blood   Result Value Ref Range    WBC 5.5 3.4 - 10.8 x10E3/uL    RBC 4.30 3.77 - 5.28 x10E6/uL    Hemoglobin 13.5 11.1 - 15.9 g/dL    Hematocrit 40.2 34.0 - 46.6 %    MCV 94 79 - 97 fL    MCH 31.4 26.6 - 33.0 pg    MCHC 33.6 31.5 - 35.7 g/dL    RDW 12.5 11.7 - 15.4 %    Platelets 251 150 - 450 x10E3/uL   Comprehensive Metabolic Panel    Specimen: Blood   Result Value Ref Range    Glucose 102 (H) 65 - 99 mg/dL    BUN 21 6 - 24 mg/dL    Creatinine 0.73 0.57 - 1.00 mg/dL    eGFR Non African Am 91 >59 mL/min/1.73    eGFR African Am 105 >59 mL/min/1.73    BUN/Creatinine Ratio 29 (H) 9 - 23    Sodium 137  134 - 144 mmol/L    Potassium 4.5 3.5 - 5.2 mmol/L    Chloride 100 96 - 106 mmol/L    Total CO2 22 20 - 29 mmol/L    Calcium 9.8 8.7 - 10.2 mg/dL    Total Protein 6.8 6.0 - 8.5 g/dL    Albumin 4.6 3.8 - 4.9 g/dL    Globulin 2.2 1.5 - 4.5 g/dL    A/G Ratio 2.1 1.2 - 2.2    Total Bilirubin 0.3 0.0 - 1.2 mg/dL    Alkaline Phosphatase 92 44 - 121 IU/L    AST (SGOT) 17 0 - 40 IU/L    ALT (SGPT) 17 0 - 32 IU/L   Lipid Panel    Specimen: Blood   Result Value Ref Range    Total Cholesterol 266 (H) 100 - 199 mg/dL    Triglycerides 92 0 - 149 mg/dL    HDL Cholesterol 103 >39 mg/dL    VLDL Cholesterol Jose Alfredo 15 5 - 40 mg/dL    LDL Chol Calc (NIH) 148 (H) 0 - 99 mg/dL     Assessment/Plan   Healthy female exam.  Diagnoses and all orders for this visit:    1. Routine adult health maintenance (Primary)    2. Encounter for screening mammogram for malignant neoplasm of breast  -     Mammo screening digital tomosynthesis bilateral w CAD; Future    3. Pleurisy, serofibrinous      1.  Cholesterol went up.  Heart rate is also elevated may be due to the Vyvanse.  On top of her normal elevated resting heart rate    2. Patient Counseling:  --Nutrition: Stressed importance of moderation in sodium/caffeine intake, saturated fat and cholesterol.  Discussed caloric balance, sufficient intake of fresh fruits, vegetables, fiber, calcium, iron.  --Exercise: Stressed the importance of regular exercise.   --Substance Abuse: Discussed cessation/primary prevention of tobacco, alcohol, or other drug use; driving or other dangerous activities under the influence.    --Dental health: Discussed importance of regular tooth brushing, flossing, and dental visits.  --Suggested having eyes and vision checked if needed or past due.  --Immunizations reviewed and given if needed.  --Discussed benefits of screening colonoscopy and or ColoGuard.  3. Discussed the patient's BMI with her.  The BMI is in the acceptable range  4. Follow up in one year    There are no  Patient Instructions on file for this visit.    Medications Discontinued During This Encounter   Medication Reason   • gabapentin (NEURONTIN) 100 MG capsule *Therapy completed   • buPROPion SR (WELLBUTRIN SR) 150 MG 12 hr tablet *Therapy completed   • Pseudoephedrine-Naproxen Na (ALEVE-D SINUS & COLD PO) *Therapy completed        Dr. Juarez García MD  [place], [place]  [facility]

## 2025-01-07 NOTE — TELEPHONE ENCOUNTER
She doesn't need new paper work  so does she still need to have the virtual appointment? New paperwork in your folder

## 2025-01-07 NOTE — TELEPHONE ENCOUNTER
Should be good for 6 months, but if updated paper work is needed, please have paper work faxed over.

## 2025-01-10 ENCOUNTER — TELEMEDICINE (OUTPATIENT)
Dept: FAMILY MEDICINE CLINIC | Age: 45
End: 2025-01-10

## 2025-01-10 DIAGNOSIS — F41.9 ANXIETY: Primary | ICD-10-CM

## 2025-01-10 DIAGNOSIS — E11.9 TYPE 2 DIABETES MELLITUS WITHOUT COMPLICATION, WITHOUT LONG-TERM CURRENT USE OF INSULIN (HCC): ICD-10-CM

## 2025-01-10 ASSESSMENT — ENCOUNTER SYMPTOMS
COUGH: 0
WHEEZING: 0
ABDOMINAL PAIN: 0
CHEST TIGHTNESS: 0
DIARRHEA: 0
VOMITING: 0
SHORTNESS OF BREATH: 0
ABDOMINAL DISTENTION: 0
BLOOD IN STOOL: 0
CONSTIPATION: 0
NAUSEA: 0
BACK PAIN: 0

## 2025-01-10 NOTE — PROGRESS NOTES
easily.   Psychiatric/Behavioral:  Positive for dysphoric mood. Negative for sleep disturbance. The patient is nervous/anxious.      Prior to Visit Medications    Medication Sig Taking? Authorizing Provider   vitamin D (ERGOCALCIFEROL) 1.25 MG (49858 UT) CAPS capsule Take 1 capsule by mouth Once a week at 5 PM  Chris Rudolph APRN - CNP   Semaglutide,0.25 or 0.5MG/DOS, 2 MG/1.5ML SOPN Inject 0.5 mg into the skin Once a week at 5 PM  Chris Rudolph APRN - CNP   atorvastatin (LIPITOR) 10 MG tablet TAKE 1 TABLET BY MOUTH DAILY  Chris Rudolph APRN - CNP   Continuous Glucose Sensor (DEXCOM G7 SENSOR) MISC Use to check blood sugar 4 times daily. Change sensor every 10 days.  Patient not taking: Reported on 11/4/2024  Chris Rudolph APRN - CNP   Continuous Glucose  (DEXCOM G7 ) NINA Use to check blood sugar 4 times daily.  Patient not taking: Reported on 11/4/2024  Chris Rudolph APRN - CNP   mupirocin (BACTROBAN) 2 % ointment Apply topically 3 times daily for 7 days.  Zen Matthews APRN - CNP   empagliflozin (JARDIANCE) 25 MG tablet Take 1 tablet by mouth daily  Chris Rudolph APRN - CNP   metFORMIN (GLUCOPHAGE-XR) 500 MG extended release tablet Take 2 tablets by mouth 2 times daily (with meals)  Chris Rudolph APRN - CNP   JARDIANCE 10 MG tablet TAKE 1 TABLET BY MOUTH DAILY  Chris Rudolph APRN - CNP   Omega-3 Fatty Acids (FISH OIL) 1000 MG capsule Take 2 capsules by mouth daily  Chris Rudolph APRN - CNP   Continuous Blood Gluc Sensor (FREESTYLE JOAN 3 SENSOR) MISC Use to check blood sugar. Change sensor every 14 days  Patient not taking: Reported on 11/4/2024  Chris Rudolph APRN - CNP   Insulin Pen Needle 32G X 6 MM MISC Use to inject Victoza once daily  Chris Rudolph APRN - CNP   Continuous Blood Gluc  (FREESTYLE JOAN 3 READER) NINA Use to check blood sugar  Patient not taking: Reported on 11/4/2024  Chris Rudolph APRN - CNP   glucose monitoring kit 1 kit by Does not apply route daily as needed

## 2025-01-21 DIAGNOSIS — E78.1 HIGH TRIGLYCERIDES: ICD-10-CM

## 2025-01-21 RX ORDER — CHLORAL HYDRATE 500 MG
2000 CAPSULE ORAL DAILY
Qty: 60 CAPSULE | Refills: 3 | Status: SHIPPED | OUTPATIENT
Start: 2025-01-21

## 2025-01-21 NOTE — TELEPHONE ENCOUNTER
Please Approve or Refuse.  Send to Pharmacy per Pt's Request:      Next Visit Date:  3/3/2025   Last Visit Date: 1/10/2025    Hemoglobin A1C (%)   Date Value   11/25/2024 6.3   08/19/2024 5.9   04/29/2024 6.0             ( goal A1C is < 7)   BP Readings from Last 3 Encounters:   11/04/24 122/74   11/04/24 109/77   10/28/24 126/87          (goal 120/80)  BUN   Date Value Ref Range Status   10/07/2024 15 6 - 20 mg/dL Final     Creatinine   Date Value Ref Range Status   10/07/2024 0.5 0.5 - 0.9 mg/dL Final     Potassium   Date Value Ref Range Status   10/07/2024 4.3 3.7 - 5.3 mmol/L Final

## 2025-02-05 ENCOUNTER — OFFICE VISIT (OUTPATIENT)
Dept: FAMILY MEDICINE CLINIC | Age: 45
End: 2025-02-05

## 2025-02-05 VITALS
OXYGEN SATURATION: 98 % | HEART RATE: 97 BPM | TEMPERATURE: 99 F | DIASTOLIC BLOOD PRESSURE: 74 MMHG | SYSTOLIC BLOOD PRESSURE: 109 MMHG

## 2025-02-05 DIAGNOSIS — J06.9 VIRAL URI WITH COUGH: Primary | ICD-10-CM

## 2025-02-05 ASSESSMENT — ENCOUNTER SYMPTOMS
APNEA: 0
CHEST TIGHTNESS: 0
EYE REDNESS: 0
COUGH: 1
SORE THROAT: 1
SHORTNESS OF BREATH: 1
DIARRHEA: 0
EYE PAIN: 0
VOMITING: 0
EYE ITCHING: 0
SINUS PAIN: 0
NAUSEA: 0
RHINORRHEA: 1

## 2025-02-05 NOTE — PROGRESS NOTES
[facility] PHYSICI[facility], [facility]  [address]  [address]  [facility] [address]  Dept: [phone]  Dept Fax: [phone]    Debbie Keenan is a 44 y.o. female who presents to the urgent care today for her medical conditions/complaints as notedbelow.  Debbie Keenan is c/o of Sinus Problem (Onset since yesterday with itchy ears, sore throat, cough, headache, hot/cold, and nasal congestion. Otc mucenix. Covid test this morning was negative.)      HPI:     Patient presents to the Walk In Clinic for evaluation of URI symptoms, onset yesterday. Negative Home Covid test this morning.    Patient Care Team:  Chris Rudolph APRN - CNP as PCP - General (Nurse Practitioner)  Chris Rudolph APRN - CNP as PCP - Empaneled Provider  Sourav Smith DO as Consulting Physician (Obstetrics & Gynecology)      Cold Symptoms   This is a new problem. The current episode started yesterday. The problem has been gradually worsening. Associated symptoms include congestion, coughing, ear pain, headaches, rhinorrhea and a sore throat. Pertinent negatives include no chest pain, diarrhea, dysuria, nausea, neck pain, plugged ear sensation, rash, sinus pain, sneezing or vomiting. Treatments tried: Mucinex. The treatment provided no relief.       Past Medical History:   Diagnosis Date    Acid reflux     Anxiety 2/3/2022    Bruises easily     Insulin resistance     Mixed hyperlipidemia 11/15/2021    Obesity, Class III, BMI 40-49.9 (morbid obesity) 7/28/2015    Type 2 diabetes mellitus with hyperglycemia 1/29/2024    Vision abnormalities         Current Outpatient Medications   Medication Sig Dispense Refill    Omega-3 Fatty Acids (FISH OIL) 1000 MG capsule TAKE 2 CAPSULES BY MOUTH DAILY 60 capsule 3    vitamin D (ERGOCALCIFEROL) 1.25 MG (85095 UT) CAPS capsule Take 1 capsule by mouth Once a week at 5 PM 12 capsule 1    Semaglutide,0.25 or 0.5MG/DOS, 2 MG/1.5ML

## 2025-02-28 ASSESSMENT — PATIENT HEALTH QUESTIONNAIRE - PHQ9
SUM OF ALL RESPONSES TO PHQ QUESTIONS 1-9: 1
2. FEELING DOWN, DEPRESSED OR HOPELESS: NOT AT ALL
SUM OF ALL RESPONSES TO PHQ QUESTIONS 1-9: 1
1. LITTLE INTEREST OR PLEASURE IN DOING THINGS: SEVERAL DAYS
SUM OF ALL RESPONSES TO PHQ9 QUESTIONS 1 & 2: 1
SUM OF ALL RESPONSES TO PHQ QUESTIONS 1-9: 1
1. LITTLE INTEREST OR PLEASURE IN DOING THINGS: SEVERAL DAYS
SUM OF ALL RESPONSES TO PHQ QUESTIONS 1-9: 1
2. FEELING DOWN, DEPRESSED OR HOPELESS: NOT AT ALL

## 2025-03-03 ENCOUNTER — OFFICE VISIT (OUTPATIENT)
Dept: FAMILY MEDICINE CLINIC | Age: 45
End: 2025-03-03

## 2025-03-03 VITALS
BODY MASS INDEX: 37.97 KG/M2 | SYSTOLIC BLOOD PRESSURE: 130 MMHG | DIASTOLIC BLOOD PRESSURE: 80 MMHG | TEMPERATURE: 97.3 F | WEIGHT: 207.6 LBS

## 2025-03-03 DIAGNOSIS — N89.8 VAGINAL DISCHARGE: ICD-10-CM

## 2025-03-03 DIAGNOSIS — E11.65 TYPE 2 DIABETES MELLITUS WITH HYPERGLYCEMIA, WITHOUT LONG-TERM CURRENT USE OF INSULIN (HCC): Primary | ICD-10-CM

## 2025-03-03 DIAGNOSIS — J06.9 UPPER RESPIRATORY TRACT INFECTION, UNSPECIFIED TYPE: ICD-10-CM

## 2025-03-03 DIAGNOSIS — Z12.4 PAP SMEAR FOR CERVICAL CANCER SCREENING: ICD-10-CM

## 2025-03-03 LAB — HBA1C MFR BLD: 6 %

## 2025-03-03 RX ORDER — DOXYCYCLINE HYCLATE 100 MG
100 TABLET ORAL 2 TIMES DAILY
Qty: 14 TABLET | Refills: 0 | Status: SHIPPED | OUTPATIENT
Start: 2025-03-03 | End: 2025-03-10

## 2025-03-03 SDOH — ECONOMIC STABILITY: FOOD INSECURITY: WITHIN THE PAST 12 MONTHS, THE FOOD YOU BOUGHT JUST DIDN'T LAST AND YOU DIDN'T HAVE MONEY TO GET MORE.: NEVER TRUE

## 2025-03-03 SDOH — ECONOMIC STABILITY: FOOD INSECURITY: WITHIN THE PAST 12 MONTHS, YOU WORRIED THAT YOUR FOOD WOULD RUN OUT BEFORE YOU GOT MONEY TO BUY MORE.: NEVER TRUE

## 2025-03-03 ASSESSMENT — ENCOUNTER SYMPTOMS
NAUSEA: 0
ABDOMINAL PAIN: 0
VOMITING: 0
SINUS PRESSURE: 1
DIARRHEA: 0
ABDOMINAL DISTENTION: 0
WHEEZING: 0
BACK PAIN: 0
BLOOD IN STOOL: 0
COUGH: 1
SHORTNESS OF BREATH: 0
CHEST TIGHTNESS: 0
CONSTIPATION: 0

## 2025-03-03 NOTE — PROGRESS NOTES
Visit Information    Have you changed or started any medications since your last visit including any over-the-counter medicines, vitamins, or herbal medicines? no   Are you having any side effects from any of your medications? -  no  Have you stopped taking any of your medications? Is so, why? -  no    Have you seen any other physician or provider since your last visit? No  Have you had any other diagnostic tests since your last visit? No  Have you been seen in the emergency room and/or had an admission to a hospital since we last saw you? No  Have you had your routine dental cleaning in the past 6 months? no    Have you activated your [employer] account? If not, what are your barriers? Yes     Patient Care Team:  Chris Rudolph APRN - CNP as PCP - General (Nurse Practitioner)  Chris Rudolph APRN - CNP as PCP - Empaneled Provider  Sourav Smith DO as Consulting Physician (Obstetrics & Gynecology)    Medical History Review  Past Medical, Family, and Social History reviewed and does not contribute to the patient presenting condition    Health Maintenance   Topic Date Due    Diabetic retinal exam  Never done    Hepatitis B vaccine (1 of 3 - 19+ 3-dose series) Never done    DTaP/Tdap/Td vaccine (1 - Tdap) Never done    Cervical cancer screen  01/15/2018    Colorectal Cancer Screen  Never done    Lipids  01/26/2025    Diabetic foot exam  01/29/2025    Diabetic Alb to Cr ratio (uACR) test  02/13/2025    COVID-19 Vaccine (3 - 2024-25 season) 10/07/2025 (Originally 9/1/2024)    Flu vaccine (1) 11/25/2025 (Originally 8/1/2024)    Pneumococcal 0-49 years Vaccine (1 of 2 - PCV) 11/25/2025 (Originally 8/5/1997)    Breast cancer screen  09/23/2025    GFR test (Diabetes, CKD 3-4, OR last GFR 15-59)  10/07/2025    A1C test (Diabetic or Prediabetic)  11/25/2025    Depression Screen  02/28/2026    Hepatitis C screen  Completed    HIV screen  Completed    Hepatitis A vaccine  Aged Out    Hib vaccine  Aged Out    HPV vaccine  
possibly from her diabetes.  She has not discussed this with her medical management team as the symptoms started over the last couple of months.  She states that it most commonly occurs after her menstrual cycle, I would like to test her for BV and yeast.  If she continues to experience yeast infections, or urinary tract infections, may need to consider adjustment with medication.  She has not been using Dexcom for monitoring her blood glucose levels. She has gained approximately 20 pounds from her lowest weight and anticipates an increase in her A1c levels. She acknowledges the need for dietary modifications and increased physical activity, noting that she has not been utilizing her treadmill at home. She does not report any foot sores, numbness, or tingling. She has been using Monistat for treatment, but the infections persist. She is currently on Ozempic 0.5 mg, which she administers on Sundays, although she missed her most recent dose due to a lack of refills.    She has not scheduled a Pap smear with Sarah yet, but states she will do so.    MEDICATIONS  Current: DayQuil, NyQuil, Mucinex, Jardiance, Ozempic, Flonase, Monistat    Review of Systems   Constitutional:  Positive for fatigue. Negative for activity change, appetite change, chills, diaphoresis, fever and unexpected weight change.   HENT:  Positive for congestion and sinus pressure. Negative for dental problem and hearing loss.    Eyes:  Negative for visual disturbance.   Respiratory:  Positive for cough. Negative for chest tightness, shortness of breath and wheezing.         Still smoking   Cardiovascular:  Negative for chest pain, palpitations and leg swelling.   Gastrointestinal:  Negative for abdominal distention, abdominal pain, blood in stool, constipation, diarrhea, nausea and vomiting.   Endocrine: Negative for cold intolerance, heat intolerance, polydipsia, polyphagia and polyuria.   Genitourinary:  Negative for difficulty urinating, dysuria,

## 2025-03-06 ENCOUNTER — APPOINTMENT (OUTPATIENT)
Dept: GENERAL RADIOLOGY | Age: 45
End: 2025-03-06
Attending: EMERGENCY MEDICINE
Payer: COMMERCIAL

## 2025-03-06 ENCOUNTER — HOSPITAL ENCOUNTER (EMERGENCY)
Age: 45
Discharge: HOME | End: 2025-03-06
Attending: EMERGENCY MEDICINE
Payer: COMMERCIAL

## 2025-03-06 VITALS
SYSTOLIC BLOOD PRESSURE: 128 MMHG | DIASTOLIC BLOOD PRESSURE: 68 MMHG | HEART RATE: 100 BPM | HEIGHT: 62 IN | RESPIRATION RATE: 22 BRPM | WEIGHT: 207 LBS | TEMPERATURE: 97.7 F | OXYGEN SATURATION: 96 % | BODY MASS INDEX: 38.09 KG/M2

## 2025-03-06 DIAGNOSIS — R05.9 COUGH, UNSPECIFIED TYPE: Primary | ICD-10-CM

## 2025-03-06 LAB
ALBUMIN SERPL-MCNC: 3.8 G/DL (ref 3.5–5.2)
ALP SERPL-CCNC: 95 U/L (ref 35–104)
ALT SERPL-CCNC: 26 U/L (ref 10–35)
ANION GAP SERPL CALCULATED.3IONS-SCNC: 12 MMOL/L (ref 9–16)
AST SERPL-CCNC: 25 U/L (ref 10–35)
BASOPHILS # BLD: 0 K/UL (ref 0–0.2)
BASOPHILS NFR BLD: 0 % (ref 0–2)
BILIRUB SERPL-MCNC: 0.7 MG/DL (ref 0–1.2)
BUN SERPL-MCNC: 12 MG/DL (ref 6–20)
CALCIUM SERPL-MCNC: 8.6 MG/DL (ref 8.6–10.4)
CHLORIDE SERPL-SCNC: 104 MMOL/L (ref 98–107)
CO2 SERPL-SCNC: 20 MMOL/L (ref 20–31)
CREAT SERPL-MCNC: 0.7 MG/DL (ref 0.7–1.2)
D DIMER PPP FEU-MCNC: 0.32 UG/ML FEU (ref 0–0.59)
EOSINOPHIL # BLD: 0.1 K/UL (ref 0–0.4)
EOSINOPHILS RELATIVE PERCENT: 1 % (ref 0–4)
ERYTHROCYTE [DISTWIDTH] IN BLOOD BY AUTOMATED COUNT: 12.6 % (ref 11.5–14.9)
FLUAV RNA RESP QL NAA+PROBE: NOT DETECTED
FLUBV RNA RESP QL NAA+PROBE: NOT DETECTED
GFR, ESTIMATED: >90 ML/MIN/1.73M2
GLUCOSE SERPL-MCNC: 151 MG/DL (ref 74–99)
HCT VFR BLD AUTO: 45 % (ref 36–46)
HGB BLD-MCNC: 15.7 G/DL (ref 12–16)
LYMPHOCYTES NFR BLD: 1 K/UL (ref 1–4.8)
LYMPHOCYTES RELATIVE PERCENT: 13 % (ref 24–44)
MAGNESIUM SERPL-MCNC: 1.7 MG/DL (ref 1.6–2.6)
MCH RBC QN AUTO: 32.6 PG (ref 26–34)
MCHC RBC AUTO-ENTMCNC: 35 G/DL (ref 31–37)
MCV RBC AUTO: 93.3 FL (ref 80–100)
MONOCYTES NFR BLD: 0.2 K/UL (ref 0.1–1.3)
MONOCYTES NFR BLD: 3 % (ref 1–7)
NEUTROPHILS NFR BLD: 83 % (ref 36–66)
NEUTS SEG NFR BLD: 6.3 K/UL (ref 1.3–9.1)
PLATELET # BLD AUTO: 209 K/UL (ref 150–450)
PMV BLD AUTO: 7.9 FL (ref 6–12)
POTASSIUM SERPL-SCNC: 4 MMOL/L (ref 3.7–5.3)
PROT SERPL-MCNC: 7.1 G/DL (ref 6.6–8.7)
RBC # BLD AUTO: 4.82 M/UL (ref 4–5.2)
SARS-COV-2 RNA RESP QL NAA+PROBE: NOT DETECTED
SODIUM SERPL-SCNC: 136 MMOL/L (ref 136–145)
SOURCE: NORMAL
SPECIMEN DESCRIPTION: NORMAL
TROPONIN I SERPL HS-MCNC: <6 NG/L (ref 0–14)
WBC OTHER # BLD: 7.6 K/UL (ref 3.5–11)

## 2025-03-06 PROCEDURE — 71046 X-RAY EXAM CHEST 2 VIEWS: CPT

## 2025-03-06 PROCEDURE — 93005 ELECTROCARDIOGRAM TRACING: CPT | Performed by: EMERGENCY MEDICINE

## 2025-03-06 PROCEDURE — 99285 EMERGENCY DEPT VISIT HI MDM: CPT

## 2025-03-06 PROCEDURE — 83735 ASSAY OF MAGNESIUM: CPT

## 2025-03-06 PROCEDURE — 80053 COMPREHEN METABOLIC PANEL: CPT

## 2025-03-06 PROCEDURE — 85025 COMPLETE CBC W/AUTO DIFF WBC: CPT

## 2025-03-06 PROCEDURE — 87636 SARSCOV2 & INF A&B AMP PRB: CPT

## 2025-03-06 PROCEDURE — 85379 FIBRIN DEGRADATION QUANT: CPT

## 2025-03-06 PROCEDURE — 84484 ASSAY OF TROPONIN QUANT: CPT

## 2025-03-06 PROCEDURE — 36415 COLL VENOUS BLD VENIPUNCTURE: CPT

## 2025-03-06 ASSESSMENT — LIFESTYLE VARIABLES
HOW OFTEN DO YOU HAVE A DRINK CONTAINING ALCOHOL: NEVER
HOW MANY STANDARD DRINKS CONTAINING ALCOHOL DO YOU HAVE ON A TYPICAL DAY: PATIENT DOES NOT DRINK

## 2025-03-06 ASSESSMENT — ENCOUNTER SYMPTOMS
SHORTNESS OF BREATH: 1
NAUSEA: 0
VOMITING: 0
ABDOMINAL PAIN: 0
COUGH: 1

## 2025-03-06 ASSESSMENT — PAIN DESCRIPTION - DESCRIPTORS: DESCRIPTORS: DISCOMFORT

## 2025-03-06 ASSESSMENT — PAIN SCALES - GENERAL: PAINLEVEL_OUTOF10: 5

## 2025-03-06 ASSESSMENT — PAIN DESCRIPTION - LOCATION: LOCATION: CHEST

## 2025-03-06 NOTE — ED NOTES
Report given to PEMA Coates from ED.   Report method in person   The following was reviewed with receiving RN:   Current vital signs:  /68   Pulse 100   Temp 97.7 °F (36.5 °C)   Resp 22   Ht 1.575 m (5' 2\")   Wt 93.9 kg (207 lb)   SpO2 96%   BMI 37.86 kg/m²                MEWS Score: 2     Any medication or safety alerts were reviewed. Any pending diagnostics and notifications were also reviewed, as well as any safety concerns or issues, abnormal labs, abnormal imaging, and abnormal assessment findings. Questions were answered.

## 2025-03-06 NOTE — ED PROVIDER NOTES
monitoring kit DAILY PRN Starting Mon 2024, Disp-1 kit, R-0, NormalNeeds Glucometer, Test Strips and Lancets      folic acid (FOLVITE) 1 MG tablet Take 1 tablet by mouth daily, Disp-90 tablet, R-4Normal       !! - Potential duplicate medications found. Please discuss with provider.        ALLERGIES     has No Known Allergies.  FAMILY HISTORY     She indicated that her mother is . She indicated that her father is . She indicated that all of her four sisters are alive. She indicated that the status of her neg hx is unknown.     SOCIAL HISTORY       Social History     Tobacco Use    Smoking status: Former     Current packs/day: 0.00     Average packs/day: 0.3 packs/day for 1.2 years (0.3 ttl pk-yrs)     Types: Cigarettes     Quit date: 2023     Years since quittin.2     Passive exposure: Past    Smokeless tobacco: Never    Tobacco comments:     quit   Vaping Use    Vaping status: Never Used   Substance Use Topics    Alcohol use: Yes     Comment: Occassional    Drug use: No     PHYSICAL EXAM     INITIAL VITALS: /68   Pulse 100   Temp 97.7 °F (36.5 °C)   Resp 22   Ht 1.575 m (5' 2\")   Wt 93.9 kg (207 lb)   SpO2 96%   BMI 37.86 kg/m²    Physical Exam  Vitals and nursing note reviewed.   Constitutional:       General: She is not in acute distress.     Appearance: She is not ill-appearing.   HENT:      Head: Normocephalic.      Right Ear: External ear normal.      Left Ear: External ear normal.      Mouth/Throat:      Mouth: Mucous membranes are moist.   Eyes:      Extraocular Movements: Extraocular movements intact.   Cardiovascular:      Rate and Rhythm: Regular rhythm. Tachycardia present.      Pulses:           Radial pulses are 2+ on the right side and 2+ on the left side.        Dorsalis pedis pulses are 2+ on the right side and 2+ on the left side.        Posterior tibial pulses are 2+ on the right side and 2+ on the left side.      Heart sounds: Normal heart sounds.

## 2025-03-06 NOTE — ED NOTES
Mode of arrival (squad #, walk in, police, etc) : walk-in        Chief complaint(s): Shortness of breath, chest discomfort        Arrival Note (brief scenario, treatment PTA, etc).: Patient c/o flu-liked symptoms for a month now. Symptoms tolerated. Patient had a consult last Monday with her PCP and was prescribed with antibiotic. Patient states that the symptoms has worsened and now with shortness of breath and chest discomfort upon coughing. Patient is alert and oriented x4.        C= \"Have you ever felt that you should Cut down on your drinking?\"  No  A= \"Have people Annoyed you by criticizing your drinking?\"  No  G= \"Have you ever felt bad or Guilty about your drinking?\"  No  E= \"Have you ever had a drink as an Eye-opener first thing in the morning to steady your nerves or to help a hangover?\"  No      Deferred []      Reason for deferring: N/A    *If yes to two or more: probable alcohol abuse.*

## 2025-03-06 NOTE — DISCHARGE INSTR - COC
Continuity of Care Form    Patient Name: Debbie Keenan   :  1980  MRN:  [id number]    Admit date:  3/6/2025  Discharge date:  ***    Code Status Order: No Order   Advance Directives:   Advance Care Flowsheet Documentation             Admitting Physician:  No admitting provider for patient encounter.  PCP: Chris Rudolph APRN - CNP    Discharging Nurse: ***  Discharging Hospital Unit/Room#:   Discharging Unit Phone Number: ***    Emergency Contact:   Extended Emergency Contact Information  Primary Emergency Contact: Rik Schrader  Address: [address] [place] [address]  Home Phone: [phone]  Mobile Phone: [phone]  Relation: Boyfriend  Secondary Emergency Contact: [place] [address] [facility]  Home Phone: [phone]  Relation: Brother/Sister    Past Surgical History:  Past Surgical History:   Procedure Laterality Date    LAPAROSCOPY  2/1/10    Open    OTHER SURGICAL HISTORY  2/1/10    chromopertubation with bilateral patent fallopian tubes       Immunization History:   Immunization History   Administered Date(s) Administered    COVID-19, MODERNA BLUE border, Primary or Immunocompromised, (age 12y+), IM, 100 mcg/0.5mL 2022, 2022       Active Problems:  Patient Active Problem List   Diagnosis Code    Acid reflux K21.9    Vision abnormalities H53.9    Bruises easily R23.3    Metrorrhagia N92.1    BMI 40.0-44.9, adult Z68.41    Family history of diabetes mellitus (DM) Z83.3    Insulin resistance E88.819    Environmental allergies Z91.09    Chronic pain of left knee M25.562, G89.29    Class 3 severe obesity due to excess calories with serious comorbidity and body mass index (BMI) of 40.0 to 44.9 in adult E66.813, E66.01, Z68.41    Family history of breast cancer in mother Z80.3    Prediabetes R73.03    PCOS (polycystic ovarian syndrome) E28.2    Personal history of tobacco use Z87.891    Mixed hyperlipidemia E78.2    Anxiety

## 2025-03-07 LAB
EKG ATRIAL RATE: 104 BPM
EKG P AXIS: 51 DEGREES
EKG P-R INTERVAL: 134 MS
EKG Q-T INTERVAL: 328 MS
EKG QRS DURATION: 72 MS
EKG QTC CALCULATION (BAZETT): 431 MS
EKG R AXIS: 115 DEGREES
EKG T AXIS: 40 DEGREES
EKG VENTRICULAR RATE: 104 BPM

## 2025-04-19 DIAGNOSIS — E11.65 TYPE 2 DIABETES MELLITUS WITH HYPERGLYCEMIA, WITHOUT LONG-TERM CURRENT USE OF INSULIN (HCC): ICD-10-CM

## 2025-04-21 RX ORDER — SEMAGLUTIDE 1.34 MG/ML
INJECTION, SOLUTION SUBCUTANEOUS
Qty: 3 ML | Refills: 3 | Status: SHIPPED | OUTPATIENT
Start: 2025-04-21

## 2025-05-14 ENCOUNTER — OFFICE VISIT (OUTPATIENT)
Dept: OBGYN CLINIC | Age: 45
End: 2025-05-14
Payer: COMMERCIAL

## 2025-05-14 ENCOUNTER — HOSPITAL ENCOUNTER (OUTPATIENT)
Age: 45
Setting detail: SPECIMEN
Discharge: HOME | End: 2025-05-14

## 2025-05-14 VITALS
WEIGHT: 222 LBS | HEIGHT: 62 IN | DIASTOLIC BLOOD PRESSURE: 82 MMHG | SYSTOLIC BLOOD PRESSURE: 122 MMHG | HEART RATE: 90 BPM | BODY MASS INDEX: 40.85 KG/M2 | RESPIRATION RATE: 16 BRPM

## 2025-05-14 DIAGNOSIS — N94.9 VAGINAL LUMP: Primary | ICD-10-CM

## 2025-05-14 DIAGNOSIS — N89.8 VAGINAL DISCHARGE: ICD-10-CM

## 2025-05-14 DIAGNOSIS — N94.9 VAGINAL LUMP: ICD-10-CM

## 2025-05-14 DIAGNOSIS — L02.92 BOIL: ICD-10-CM

## 2025-05-14 PROCEDURE — 99213 OFFICE O/P EST LOW 20 MIN: CPT | Performed by: NURSE PRACTITIONER

## 2025-05-14 RX ORDER — DOXYCYCLINE HYCLATE 100 MG
100 TABLET ORAL 2 TIMES DAILY
Qty: 14 TABLET | Refills: 0 | Status: SHIPPED | OUTPATIENT
Start: 2025-05-14 | End: 2025-05-21

## 2025-05-14 NOTE — PROGRESS NOTES
Debbie Keenan  2025    YOB: 1980          The patient was seen today. She is here regarding lump to vaginal area. Noticed it started less than 1 week ago. Has had some drainage from it last couple of days. Put some salve on it, Prid, and got some drainage out of it. Diagnosed 1 year ago as type II diabetic. Currently on Jardiance. Sexually active with . Her bowels are regular and she is voiding without difficulty.     HPI:  Debbie Keenan is a 44 y.o. female      Lump to vaginal area      OB History    Para Term  AB Living   0 0 0 0 0 0   SAB IAB Ectopic Molar Multiple Live Births   0 0 0 0 0 0       Past Medical History:   Diagnosis Date    Acid reflux     Anxiety 2/3/2022    Bruises easily     Insulin resistance     Mixed hyperlipidemia 11/15/2021    Obesity, Class III, BMI 40-49.9 (morbid obesity) ([facility]) 2015    Type 2 diabetes mellitus with hyperglycemia 2024    Vision abnormalities        Past Surgical History:   Procedure Laterality Date    LAPAROSCOPY  2/1/10    Open    OTHER SURGICAL HISTORY  2/1/10    chromopertubation with bilateral patent fallopian tubes       Family History   Problem Relation Age of Onset    Heart Disease Father     Early Death Father     Other Mother         pulmonary HTN, pulmonary fibrosis    Breast Cancer Mother         initially dx before age 50 & again after age 50    Other Sister         hysterectomy    Diabetes Sister         GDM    Other Sister         Hysterectomy    Other Sister         hysterectomy    Diabetes Sister     Other Sister         hysterectomy    Diabetes Sister     Cancer Neg Hx     Colon Cancer Neg Hx     Eclampsia Neg Hx     Hypertension Neg Hx     Ovarian Cancer Neg Hx      Labor Neg Hx     Spont Abortions Neg Hx     Stroke Neg Hx        Social History     Socioeconomic History    Marital status: Single     Spouse name: Not on file    Number of children: Not on file    Years of

## 2025-05-15 ENCOUNTER — RESULTS FOLLOW-UP (OUTPATIENT)
Dept: OBGYN CLINIC | Age: 45
End: 2025-05-15

## 2025-05-15 LAB
C TRACH DNA SPEC QL PROBE+SIG AMP: NEGATIVE
CANDIDA SPECIES: NEGATIVE
GARDNERELLA VAGINALIS: NEGATIVE
N GONORRHOEA DNA SPEC QL PROBE+SIG AMP: NEGATIVE
SOURCE: NORMAL
SPECIMEN DESCRIPTION: NORMAL
TRICHOMONAS: NEGATIVE

## 2025-05-16 DIAGNOSIS — Z22.322 MRSA (METHICILLIN RESISTANT STAPH AUREUS) CULTURE POSITIVE: Primary | ICD-10-CM

## 2025-05-16 LAB
MICROORGANISM SPEC CULT: ABNORMAL
MICROORGANISM/AGENT SPEC: ABNORMAL
SPECIMEN DESCRIPTION: ABNORMAL

## 2025-05-16 RX ORDER — CLINDAMYCIN HYDROCHLORIDE 300 MG/1
300 CAPSULE ORAL 3 TIMES DAILY
Qty: 21 CAPSULE | Refills: 0 | Status: SHIPPED | OUTPATIENT
Start: 2025-05-16 | End: 2025-05-23

## 2025-05-16 NOTE — TELEPHONE ENCOUNTER
[facility] Result Note Patient Contact Communication   [address] [place] [address]      05/16/25   9:00 AM     Patients Name: Debbie Keenan   Medical Record Number: [id number]   Contact Serial Number: [id number]  YOB: 1980       Patients Phone Number: [phone]     Description of Recommendations:  The patient was contacted and her identity was confirmed with two of the specific identifiers listed above.  The information regarding her recent testing results and provider recommendations were reviewed with her in detail and all questions were answered.   Recent labs/Cultures/ Imaging Studies/Pathology reports and Urinalysis results are included:      Recommendations given by provider:  + MRSA in vaginal sore  Discontinue doxycycline.  Recommend cleocin 300mg PO TID x 7 days  Refer to infectious disease for + MRSA.        The patient verbalized understanding of the information above and the recommendation by the provider. She will contact her PCP if any other questions arise or contact our office for any other clarifications.        Electronically signed by Keely Dinh on 5/16/2025 at 9:00 AM

## 2025-06-26 ENCOUNTER — OFFICE VISIT (OUTPATIENT)
Dept: INFECTIOUS DISEASES | Age: 45
End: 2025-06-26
Payer: COMMERCIAL

## 2025-06-26 VITALS
WEIGHT: 224 LBS | SYSTOLIC BLOOD PRESSURE: 132 MMHG | TEMPERATURE: 98.1 F | HEIGHT: 62 IN | BODY MASS INDEX: 41.22 KG/M2 | DIASTOLIC BLOOD PRESSURE: 79 MMHG | HEART RATE: 91 BPM

## 2025-06-26 DIAGNOSIS — Z22.322 MRSA (METHICILLIN RESISTANT STAPH AUREUS) CULTURE POSITIVE: ICD-10-CM

## 2025-06-26 DIAGNOSIS — N76.4 ABSCESS OF GENITAL LABIA: Primary | ICD-10-CM

## 2025-06-26 PROCEDURE — 99203 OFFICE O/P NEW LOW 30 MIN: CPT | Performed by: INTERNAL MEDICINE

## 2025-06-26 NOTE — PROGRESS NOTES
[facility]   Office Consult Note  Today's Date and Time: 6/26/2025, 10:44 AM    Impression:     1. Abscess of genital labia    2. MRSA (methicillin resistant staph aureus) culture positive 5/2025         Recommendations   The patient had a MRSA labial abscess treated with doxycycline in early May  The patient reports that there is a residual knot to the area but no longer has the soft tissue swelling induration  We did discuss that MRSA can be an infection that can happen once and never happen again but can also subsequently recur in the future  At this point in time I do not plan on any further antibiotic treatment given that the infection has resolved but the patient has been educated about MRSA infection and potential for recurrence  If she does get frequent recurrent infections that could be an option to potentially try to decolonize her but if they are intermittent infections these will be treated as come    I have ordered the following medications/ labs:  No orders of the defined types were placed in this encounter.     No orders of the defined types were placed in this encounter.      Chief complaint/reason for consultation:     Chief Complaint   Patient presents with    MRSA     NEW PATIENT         History of Present Illness:   Debbie Keenan is a 44 y.o.-year-old female who is seen at there request of America Easley APRN - C*    Debbie has diabetes mellitus type 2, morbid obesity, mixed hyperlipidemia, anxiety disorder, and gastroesophageal reflux disease.      She reports that she noticed a labial lesion and initially thought that it was a blocked duct/pimple and may have developed it from an ingrowing hair from shaving.  She ended up going to see her OB/GYN physician and had an evaluation including a vaginitis DNA probe testing for chlamydia and gonorrhea as well as a culture.    The culture did end up growing out MRSA and the patient had doxycycline prescribed by the GYN

## 2025-07-02 ENCOUNTER — OFFICE VISIT (OUTPATIENT)
Dept: FAMILY MEDICINE CLINIC | Age: 45
End: 2025-07-02

## 2025-07-02 VITALS
BODY MASS INDEX: 41.22 KG/M2 | OXYGEN SATURATION: 96 % | WEIGHT: 224 LBS | DIASTOLIC BLOOD PRESSURE: 80 MMHG | HEART RATE: 93 BPM | HEIGHT: 62 IN | SYSTOLIC BLOOD PRESSURE: 104 MMHG

## 2025-07-02 DIAGNOSIS — E11.65 TYPE 2 DIABETES MELLITUS WITH HYPERGLYCEMIA, WITHOUT LONG-TERM CURRENT USE OF INSULIN (HCC): Primary | ICD-10-CM

## 2025-07-02 DIAGNOSIS — Z12.11 SCREENING FOR COLON CANCER: ICD-10-CM

## 2025-07-02 LAB — HBA1C MFR BLD: 7.8 %

## 2025-07-02 ASSESSMENT — ENCOUNTER SYMPTOMS
ABDOMINAL DISTENTION: 0
VOMITING: 0
NAUSEA: 0
BLOOD IN STOOL: 0
COUGH: 0
CONSTIPATION: 0
BACK PAIN: 0
ABDOMINAL PAIN: 0
CHEST TIGHTNESS: 0
SHORTNESS OF BREATH: 0
DIARRHEA: 0
WHEEZING: 0

## 2025-07-02 NOTE — PROGRESS NOTES
Debbie Keenan (:  1980) is a 44 y.o. female, Established patient, here for evaluation of the following chief complaint(s):  Diabetes, Discuss Medications, and Health Maintenance (Dm eye exam still need to schedule )         Assessment & Plan  1. Diabetes mellitus.  - Gained approximately 30 pounds since the holidays; acknowledges inconsistent medication adherence.  - Current dosage of Ozempic is 1 mg; will be increased to 2 mg.  - Advised to get blood work done upon returning from the trip; expires in 10/2025. Blood work should be done every 6 months.  - Follow-up scheduled in 3 months.    Results    1. Type 2 diabetes mellitus with hyperglycemia, without long-term current use of insulin ([facility])  Lab Results   Component Value Date    LABA1C 7.8 (H) 2025    LABA1C 6.0 2025    LABA1C 6.3 2024   -Worsening  -Patient has been compliant with medication regimen  -Patient has been eating very poorly  -Plan to adjust Ozempic, increase the dose to 2 mg as patient is tolerating  -States she is reema work on diet changes and exercise and states that in 3 months her A1c will improve  -Recheck in October  -     Albumin/Creatinine Ratio, Urine (uACR); Future  -     POCT glycosylated hemoglobin (Hb A1C)  -     Semaglutide, 1 MG/DOSE, 2 MG/1.5ML SOPN; Inject 2 mg into the skin once a week for 4 doses, Disp-1.5 mL, R-2Normal  2. Screening for colon cancer  -     POCT FECAL IMMUNOCHEMICAL TEST (FIT); Future    Return in about 3 months (around 10/2/2025) for a1c check .       Subjective   History of Present Illness  The patient presents for evaluation of diabetes.    She has been on a regimen of Ozempic, initially at a dose of 0.5 mg, which was later increased to 1 mg. However, she admits to not adhering strictly to this medication schedule. Since the  period, she has experienced a weight gain of approximately 30 pounds. She reports no issues with constipation or other bowel 
vaccine  Aged Out    HPV vaccine  Aged Out    Polio vaccine  Aged Out    Meningococcal (ACWY) vaccine  Aged Out    Meningococcal B vaccine  Aged Out